# Patient Record
Sex: FEMALE | Race: OTHER | Employment: UNEMPLOYED | URBAN - METROPOLITAN AREA
[De-identification: names, ages, dates, MRNs, and addresses within clinical notes are randomized per-mention and may not be internally consistent; named-entity substitution may affect disease eponyms.]

---

## 2024-05-10 ENCOUNTER — HOSPITAL ENCOUNTER (EMERGENCY)
Age: 39
Discharge: PSYCHIATRIC OR SUBSTANCE USE FACILITY | End: 2024-05-11
Attending: EMERGENCY MEDICINE

## 2024-05-10 VITALS
HEART RATE: 100 BPM | TEMPERATURE: 99.4 F | WEIGHT: 132.28 LBS | HEIGHT: 63 IN | SYSTOLIC BLOOD PRESSURE: 127 MMHG | DIASTOLIC BLOOD PRESSURE: 85 MMHG | BODY MASS INDEX: 23.44 KG/M2 | RESPIRATION RATE: 20 BRPM | OXYGEN SATURATION: 99 %

## 2024-05-10 DIAGNOSIS — F20.9 SCHIZOPHRENIA, UNSPECIFIED TYPE (HCC): ICD-10-CM

## 2024-05-10 DIAGNOSIS — R45.851 SUICIDAL IDEATION: Primary | ICD-10-CM

## 2024-05-10 LAB
ALBUMIN SERPL-MCNC: 4.5 G/DL (ref 3.4–5)
ALBUMIN/GLOB SERPL: 1.6 {RATIO} (ref 1.1–2.2)
ALP SERPL-CCNC: 46 U/L (ref 40–129)
ALT SERPL-CCNC: 11 U/L (ref 10–40)
AMPHETAMINES UR QL SCN>1000 NG/ML: NORMAL
ANION GAP SERPL CALCULATED.3IONS-SCNC: 13 MMOL/L (ref 3–16)
APAP SERPL-MCNC: <5 UG/ML (ref 10–30)
AST SERPL-CCNC: 17 U/L (ref 15–37)
BACTERIA URNS QL MICRO: NORMAL /HPF
BARBITURATES UR QL SCN>200 NG/ML: NORMAL
BASOPHILS # BLD: 0.1 K/UL (ref 0–0.2)
BASOPHILS NFR BLD: 0.9 %
BENZODIAZ UR QL SCN>200 NG/ML: NORMAL
BILIRUB SERPL-MCNC: 0.3 MG/DL (ref 0–1)
BILIRUB UR QL STRIP.AUTO: NEGATIVE
BUN SERPL-MCNC: 8 MG/DL (ref 7–20)
CALCIUM SERPL-MCNC: 9.1 MG/DL (ref 8.3–10.6)
CANNABINOIDS UR QL SCN>50 NG/ML: NORMAL
CHLORIDE SERPL-SCNC: 104 MMOL/L (ref 99–110)
CLARITY UR: CLEAR
CO2 SERPL-SCNC: 23 MMOL/L (ref 21–32)
COCAINE UR QL SCN: NORMAL
COLOR UR: YELLOW
CREAT SERPL-MCNC: 0.6 MG/DL (ref 0.6–1.1)
DEPRECATED RDW RBC AUTO: 15 % (ref 12.4–15.4)
DRUG SCREEN COMMENT UR-IMP: NORMAL
EOSINOPHIL # BLD: 0.1 K/UL (ref 0–0.6)
EOSINOPHIL NFR BLD: 1.1 %
EPI CELLS #/AREA URNS AUTO: 0 /HPF (ref 0–5)
ETHANOLAMINE SERPL-MCNC: NORMAL MG/DL (ref 0–0.08)
FENTANYL SCREEN, URINE: NORMAL
FLUAV RNA RESP QL NAA+PROBE: NOT DETECTED
FLUBV RNA RESP QL NAA+PROBE: NOT DETECTED
GFR SERPLBLD CREATININE-BSD FMLA CKD-EPI: >90 ML/MIN/{1.73_M2}
GLUCOSE SERPL-MCNC: 113 MG/DL (ref 70–99)
GLUCOSE UR STRIP.AUTO-MCNC: NEGATIVE MG/DL
HCG SERPL QL: NEGATIVE
HCT VFR BLD AUTO: 37.9 % (ref 36–48)
HGB BLD-MCNC: 12.8 G/DL (ref 12–16)
HGB UR QL STRIP.AUTO: NEGATIVE
HYALINE CASTS #/AREA URNS AUTO: 0 /LPF (ref 0–8)
KETONES UR STRIP.AUTO-MCNC: NEGATIVE MG/DL
LEUKOCYTE ESTERASE UR QL STRIP.AUTO: ABNORMAL
LIPASE SERPL-CCNC: 24 U/L (ref 13–60)
LYMPHOCYTES # BLD: 1.4 K/UL (ref 1–5.1)
LYMPHOCYTES NFR BLD: 21.7 %
MCH RBC QN AUTO: 28.1 PG (ref 26–34)
MCHC RBC AUTO-ENTMCNC: 33.9 G/DL (ref 31–36)
MCV RBC AUTO: 82.9 FL (ref 80–100)
METHADONE UR QL SCN>300 NG/ML: NORMAL
MONOCYTES # BLD: 0.4 K/UL (ref 0–1.3)
MONOCYTES NFR BLD: 5.7 %
NEUTROPHILS # BLD: 4.7 K/UL (ref 1.7–7.7)
NEUTROPHILS NFR BLD: 70.6 %
NITRITE UR QL STRIP.AUTO: NEGATIVE
OPIATES UR QL SCN>300 NG/ML: NORMAL
OXYCODONE UR QL SCN: NORMAL
PCP UR QL SCN>25 NG/ML: NORMAL
PH UR STRIP.AUTO: 8 [PH] (ref 5–8)
PH UR STRIP: 8 [PH]
PLATELET # BLD AUTO: 256 K/UL (ref 135–450)
PMV BLD AUTO: 7.8 FL (ref 5–10.5)
POTASSIUM SERPL-SCNC: 4 MMOL/L (ref 3.5–5.1)
PROT SERPL-MCNC: 7.3 G/DL (ref 6.4–8.2)
PROT UR STRIP.AUTO-MCNC: NEGATIVE MG/DL
RBC # BLD AUTO: 4.57 M/UL (ref 4–5.2)
RBC CLUMPS #/AREA URNS AUTO: 2 /HPF (ref 0–4)
SALICYLATES SERPL-MCNC: <0.3 MG/DL (ref 15–30)
SARS-COV-2 RNA RESP QL NAA+PROBE: NOT DETECTED
SODIUM SERPL-SCNC: 140 MMOL/L (ref 136–145)
SP GR UR STRIP.AUTO: <=1.005 (ref 1–1.03)
UA COMPLETE W REFLEX CULTURE PNL UR: ABNORMAL
UA DIPSTICK W REFLEX MICRO PNL UR: YES
URN SPEC COLLECT METH UR: ABNORMAL
UROBILINOGEN UR STRIP-ACNC: 0.2 E.U./DL
WBC # BLD AUTO: 6.6 K/UL (ref 4–11)
WBC #/AREA URNS AUTO: 1 /HPF (ref 0–5)

## 2024-05-10 PROCEDURE — 83690 ASSAY OF LIPASE: CPT

## 2024-05-10 PROCEDURE — 87636 SARSCOV2 & INF A&B AMP PRB: CPT

## 2024-05-10 PROCEDURE — 80053 COMPREHEN METABOLIC PANEL: CPT

## 2024-05-10 PROCEDURE — 99285 EMERGENCY DEPT VISIT HI MDM: CPT

## 2024-05-10 PROCEDURE — 6370000000 HC RX 637 (ALT 250 FOR IP): Performed by: PHYSICIAN ASSISTANT

## 2024-05-10 PROCEDURE — 80143 DRUG ASSAY ACETAMINOPHEN: CPT

## 2024-05-10 PROCEDURE — 80179 DRUG ASSAY SALICYLATE: CPT

## 2024-05-10 PROCEDURE — 84703 CHORIONIC GONADOTROPIN ASSAY: CPT

## 2024-05-10 PROCEDURE — 81001 URINALYSIS AUTO W/SCOPE: CPT

## 2024-05-10 PROCEDURE — 85025 COMPLETE CBC W/AUTO DIFF WBC: CPT

## 2024-05-10 PROCEDURE — 80307 DRUG TEST PRSMV CHEM ANLYZR: CPT

## 2024-05-10 PROCEDURE — 82077 ASSAY SPEC XCP UR&BREATH IA: CPT

## 2024-05-10 RX ORDER — ESCITALOPRAM OXALATE 20 MG/1
20 TABLET ORAL DAILY
Status: ON HOLD | COMMUNITY

## 2024-05-10 RX ORDER — ARIPIPRAZOLE 30 MG/1
30 TABLET ORAL DAILY
Status: ON HOLD | COMMUNITY

## 2024-05-10 RX ORDER — OLANZAPINE 5 MG/1
20 TABLET ORAL NIGHTLY
Status: DISCONTINUED | OUTPATIENT
Start: 2024-05-10 | End: 2024-05-11 | Stop reason: HOSPADM

## 2024-05-10 RX ORDER — OLANZAPINE 20 MG/1
20 TABLET ORAL NIGHTLY
Status: ON HOLD | COMMUNITY

## 2024-05-10 RX ADMIN — OLANZAPINE 20 MG: 5 TABLET, FILM COATED ORAL at 23:37

## 2024-05-10 ASSESSMENT — ENCOUNTER SYMPTOMS
COUGH: 0
VOMITING: 0
ABDOMINAL PAIN: 0
WHEEZING: 0
SHORTNESS OF BREATH: 0
CHEST TIGHTNESS: 0
NAUSEA: 1
DIARRHEA: 0

## 2024-05-10 ASSESSMENT — PAIN - FUNCTIONAL ASSESSMENT: PAIN_FUNCTIONAL_ASSESSMENT: 0-10

## 2024-05-10 ASSESSMENT — LIFESTYLE VARIABLES
HOW OFTEN DO YOU HAVE A DRINK CONTAINING ALCOHOL: NEVER
HOW MANY STANDARD DRINKS CONTAINING ALCOHOL DO YOU HAVE ON A TYPICAL DAY: PATIENT DOES NOT DRINK

## 2024-05-10 ASSESSMENT — PAIN SCALES - GENERAL: PAINLEVEL_OUTOF10: 10

## 2024-05-10 NOTE — ED PROVIDER NOTES
[facility] EMERGENCY DEPARTMENT  EMERGENCY DEPARTMENT ENCOUNTER        Pt Name: Mahesh Youngblood  MRN: [id number]  Birthdate 1985  Date of evaluation: 5/10/2024  Provider: Giovany Moore PA-C  PCP: No primary care provider on file.  Note Started: 7:37 PM EDT 5/10/24       I have seen and evaluated this patient with my supervising physician Jerry Barlow MD.      CHIEF COMPLAINT       Chief Complaint   Patient presents with    Headache     Pt had a severe headache and illness symptoms starting last night    Emesis    Nausea    Cough       HISTORY OF PRESENT ILLNESS: 1 or more Elements     History from : Patient    Limitations to history : None    Mahesh Youngblood is a 38 y.o. female with a history of depression, anxiety and schizophrenia who presents to the emergency department today with her brother stating she just had a bad break-up and is now very depressed.  I did gather history from both patient and her brother, whom she lives with.  Chief complaint states \"headache\" however patient denies headache but states her depression is \"hurting my mind\".  Patient continues stating she is very scared and is \"out of my mind\".  Brother states patient and her family just moved here from [place] 1 year ago.  Brother states patient did have some episodes of what he describes as a psychotic break while patient lived in [place].  He states that patient is very scared that that is going to happen again.  Brother states that patient has become very aggressive in the past towards other people and even herself during these episodes and patient states she is very scared this is going to happen again.  Patient denies headache.  She denies lightheadedness, dizziness, vision changes, numbness, tingling or weakness in her extremities.  She denies chest pain or shortness of breath.  She does report a few episodes of nausea and vomiting today but denies abdominal pain.  Patient seems very tearful and upset.  She denies  radiographic images are visualized and preliminarily interpreted by the ED Provider with the below findings:        Interpretation per the Radiologist below, if available at the time of this note:    No orders to display     No results found.    No results found.    PROCEDURES   Unless otherwise noted below, none     Procedures    CRITICAL CARE TIME (.cctime)   There was a high probability of life-threatening clinical deterioration in the patient's condition requiring my urgent intervention.  I personally saw the patient and independently provided 36 minutes of non-concurrent critical care out of the total shared critical care time provided, excluding separately reportable procedures.         PAST MEDICAL HISTORY      has a past medical history of Anxiety, Depression, and Schizophrenia ([facility]).     Chronic Conditions affecting Care: None    EMERGENCY DEPARTMENT COURSE and DIFFERENTIAL DIAGNOSIS/MDM:   Vitals:    Vitals:    05/10/24 1845 05/10/24 1852   BP:  127/85   Pulse: 100    Resp: 20    Temp: 99.4 °F (37.4 °C)    TempSrc: Oral    SpO2: 99%    Weight: 60 kg (132 lb 4.4 oz)    Height: 1.59 m (5' 2.6\")        Patient was given the following medications:  Medications - No data to display          Is this patient to be included in the SEP-1 Core Measure due to severe sepsis or septic shock?   No   Exclusion criteria - the patient is NOT to be included for SEP-1 Core Measure due to:  Infection is not suspected    CONSULTS: (Who and What was discussed)  IP CONSULT TO TELE-PSYCH (SOCIAL WORK ONLY)  Discussion with Other Profesionals : Consultant .  Psychiatry    Social Determinants : None    Records Reviewed : None    CC/HPI Summary, DDx, ED Course, and Reassessment: Patient with a history of depression, anxiety and schizophrenia presented to the emergency department today with her brother stating she just had a bad break-up and is now very depressed.  I did gather history from both patient and her brother, whom she

## 2024-05-10 NOTE — ED NOTES
Pt states that she does take medications but cannot remember what kind. Is calling home to get medication information   1988

## 2024-05-11 ENCOUNTER — HOSPITAL ENCOUNTER (INPATIENT)
Age: 39
LOS: 10 days | Discharge: HOME | DRG: 885 | End: 2024-05-21
Attending: PSYCHIATRY & NEUROLOGY | Admitting: PSYCHIATRY & NEUROLOGY

## 2024-05-11 ENCOUNTER — APPOINTMENT (OUTPATIENT)
Dept: CT IMAGING | Age: 39
DRG: 885 | End: 2024-05-11
Attending: PSYCHIATRY & NEUROLOGY

## 2024-05-11 PROBLEM — F33.9 MAJOR DEPRESSIVE DISORDER, RECURRENT (HCC): Status: ACTIVE | Noted: 2024-05-11

## 2024-05-11 PROBLEM — F29 PSYCHOSIS (HCC): Status: ACTIVE | Noted: 2024-05-11

## 2024-05-11 PROBLEM — R42 DIZZINESS: Status: ACTIVE | Noted: 2024-05-11

## 2024-05-11 LAB
EKG ATRIAL RATE: 87 BPM
EKG DIAGNOSIS: NORMAL
EKG P AXIS: 60 DEGREES
EKG P-R INTERVAL: 188 MS
EKG Q-T INTERVAL: 352 MS
EKG QRS DURATION: 66 MS
EKG QTC CALCULATION (BAZETT): 423 MS
EKG R AXIS: 12 DEGREES
EKG T AXIS: 43 DEGREES
EKG VENTRICULAR RATE: 87 BPM
TSH SERPL DL<=0.005 MIU/L-ACNC: 3.63 UIU/ML (ref 0.27–4.2)

## 2024-05-11 PROCEDURE — 1240000000 HC EMOTIONAL WELLNESS R&B

## 2024-05-11 PROCEDURE — 93010 ELECTROCARDIOGRAM REPORT: CPT | Performed by: INTERNAL MEDICINE

## 2024-05-11 PROCEDURE — 84443 ASSAY THYROID STIM HORMONE: CPT

## 2024-05-11 PROCEDURE — 99221 1ST HOSP IP/OBS SF/LOW 40: CPT

## 2024-05-11 PROCEDURE — 36415 COLL VENOUS BLD VENIPUNCTURE: CPT

## 2024-05-11 PROCEDURE — 6370000000 HC RX 637 (ALT 250 FOR IP): Performed by: NURSE PRACTITIONER

## 2024-05-11 PROCEDURE — 83036 HEMOGLOBIN GLYCOSYLATED A1C: CPT

## 2024-05-11 PROCEDURE — 80061 LIPID PANEL: CPT

## 2024-05-11 PROCEDURE — 70450 CT HEAD/BRAIN W/O DYE: CPT

## 2024-05-11 PROCEDURE — 93005 ELECTROCARDIOGRAM TRACING: CPT | Performed by: NURSE PRACTITIONER

## 2024-05-11 RX ORDER — MAGNESIUM HYDROXIDE/ALUMINUM HYDROXICE/SIMETHICONE 120; 1200; 1200 MG/30ML; MG/30ML; MG/30ML
30 SUSPENSION ORAL EVERY 6 HOURS PRN
Status: DISCONTINUED | OUTPATIENT
Start: 2024-05-11 | End: 2024-05-21 | Stop reason: HOSPADM

## 2024-05-11 RX ORDER — HYDROXYZINE 50 MG/1
50 TABLET, FILM COATED ORAL 3 TIMES DAILY PRN
Status: DISCONTINUED | OUTPATIENT
Start: 2024-05-11 | End: 2024-05-21 | Stop reason: HOSPADM

## 2024-05-11 RX ORDER — NICOTINE 21 MG/24HR
1 PATCH, TRANSDERMAL 24 HOURS TRANSDERMAL DAILY
Status: DISCONTINUED | OUTPATIENT
Start: 2024-05-11 | End: 2024-05-21 | Stop reason: HOSPADM

## 2024-05-11 RX ORDER — TRAZODONE HYDROCHLORIDE 50 MG/1
50 TABLET ORAL NIGHTLY PRN
Status: DISCONTINUED | OUTPATIENT
Start: 2024-05-11 | End: 2024-05-21 | Stop reason: HOSPADM

## 2024-05-11 RX ORDER — OLANZAPINE 10 MG/1
40 TABLET ORAL NIGHTLY
Status: DISCONTINUED | OUTPATIENT
Start: 2024-05-11 | End: 2024-05-12

## 2024-05-11 RX ORDER — ACETAMINOPHEN 325 MG/1
650 TABLET ORAL EVERY 4 HOURS PRN
Status: DISCONTINUED | OUTPATIENT
Start: 2024-05-11 | End: 2024-05-21 | Stop reason: HOSPADM

## 2024-05-11 RX ORDER — IBUPROFEN 400 MG/1
400 TABLET ORAL EVERY 6 HOURS PRN
Status: DISCONTINUED | OUTPATIENT
Start: 2024-05-11 | End: 2024-05-21 | Stop reason: HOSPADM

## 2024-05-11 RX ORDER — BENZTROPINE MESYLATE 1 MG/ML
2 INJECTION INTRAMUSCULAR; INTRAVENOUS 2 TIMES DAILY PRN
Status: DISCONTINUED | OUTPATIENT
Start: 2024-05-11 | End: 2024-05-21 | Stop reason: HOSPADM

## 2024-05-11 RX ORDER — ARIPIPRAZOLE 15 MG/1
30 TABLET ORAL DAILY
Status: DISCONTINUED | OUTPATIENT
Start: 2024-05-12 | End: 2024-05-17

## 2024-05-11 RX ORDER — POLYETHYLENE GLYCOL 3350 17 G
2 POWDER IN PACKET (EA) ORAL
Status: DISCONTINUED | OUTPATIENT
Start: 2024-05-11 | End: 2024-05-21 | Stop reason: HOSPADM

## 2024-05-11 RX ORDER — OLANZAPINE 5 MG/1
5 TABLET ORAL EVERY 4 HOURS PRN
Status: DISCONTINUED | OUTPATIENT
Start: 2024-05-11 | End: 2024-05-21 | Stop reason: HOSPADM

## 2024-05-11 RX ADMIN — OLANZAPINE 40 MG: 10 TABLET, FILM COATED ORAL at 21:47

## 2024-05-11 ASSESSMENT — PATIENT HEALTH QUESTIONNAIRE - PHQ9
10. IF YOU CHECKED OFF ANY PROBLEMS, HOW DIFFICULT HAVE THESE PROBLEMS MADE IT FOR YOU TO DO YOUR WORK, TAKE CARE OF THINGS AT HOME, OR GET ALONG WITH OTHER PEOPLE: VERY DIFFICULT
4. FEELING TIRED OR HAVING LITTLE ENERGY: NEARLY EVERY DAY
6. FEELING BAD ABOUT YOURSELF - OR THAT YOU ARE A FAILURE OR HAVE LET YOURSELF OR YOUR FAMILY DOWN: NOT AT ALL
5. POOR APPETITE OR OVEREATING: NEARLY EVERY DAY
1. LITTLE INTEREST OR PLEASURE IN DOING THINGS: NEARLY EVERY DAY
SUM OF ALL RESPONSES TO PHQ9 QUESTIONS 1 & 2: 6
SUM OF ALL RESPONSES TO PHQ QUESTIONS 1-9: 20
8. MOVING OR SPEAKING SO SLOWLY THAT OTHER PEOPLE COULD HAVE NOTICED. OR THE OPPOSITE, BEING SO FIGETY OR RESTLESS THAT YOU HAVE BEEN MOVING AROUND A LOT MORE THAN USUAL: NOT AT ALL
9. THOUGHTS THAT YOU WOULD BE BETTER OFF DEAD, OR OF HURTING YOURSELF: NEARLY EVERY DAY
7. TROUBLE CONCENTRATING ON THINGS, SUCH AS READING THE NEWSPAPER OR WATCHING TELEVISION: MORE THAN HALF THE DAYS
2. FEELING DOWN, DEPRESSED OR HOPELESS: NEARLY EVERY DAY
SUM OF ALL RESPONSES TO PHQ QUESTIONS 1-9: 20
SUM OF ALL RESPONSES TO PHQ QUESTIONS 1-9: 20
3. TROUBLE FALLING OR STAYING ASLEEP: NEARLY EVERY DAY
SUM OF ALL RESPONSES TO PHQ QUESTIONS 1-9: 17

## 2024-05-11 ASSESSMENT — SLEEP AND FATIGUE QUESTIONNAIRES
DO YOU HAVE DIFFICULTY SLEEPING: YES
AVERAGE NUMBER OF SLEEP HOURS: 4
DO YOU HAVE DIFFICULTY SLEEPING: YES
AVERAGE NUMBER OF SLEEP HOURS: 4
SLEEP PATTERN: DISTURBED/INTERRUPTED SLEEP;DIFFICULTY FALLING ASLEEP
DO YOU USE A SLEEP AID: NO
DO YOU USE A SLEEP AID: NO

## 2024-05-11 NOTE — H&P
Hospital Medicine History & Physical      PCP: No primary care provider on file.    Date of Admission: 5/11/2024    Date of Service: Pt seen/examined on 5/11/2024     Chief Complaint:  No chief complaint on file.        History Of Present Illness:      The patient is a 38 y.o. female with pmhx of anxiety, depression, schizophrenia who presented to [facility] for depression, psychotic episode.  Patient was seen and evaluated in the ED by the ED medical provider, patient was medically cleared for admission to [facility].  This note serves as an admission medical H&P.    Tobacco use: None   ETOH use: None   Illicit drug use: None     Patient is complaining of mild dizziness. No headache or vision changes. Gait is normal.     Past Medical History:        Diagnosis Date    Anxiety     Depression     Depression     Schizophrenia (HCC)        Past Surgical History:    History reviewed. No pertinent surgical history.    Medications Prior to Admission:    Prior to Admission medications    Medication Sig Start Date End Date Taking? Authorizing Provider   escitalopram (LEXAPRO) 20 MG tablet Take 1 tablet by mouth daily    Elyse Vasquez MD   OLANZapine (ZYPREXA) 20 MG tablet Take 1 tablet by mouth nightly    Elyse Vasquez MD   ARIPiprazole (ABILIFY) 30 MG tablet Take 1 tablet by mouth daily    Elyse Vasquez MD   escitalopram (LEXAPRO) 20 MG tablet Take 1 tablet by mouth daily    ProviderElyse MD       Allergies:  Patient has no known allergies.    Social History:      TOBACCO:   reports that she has never smoked. She has never used smokeless tobacco.  ETOH:   reports no history of alcohol use.      Family History:   Positive as follows:    History reviewed. No pertinent family history.    REVIEW OF SYSTEMS:       Constitutional: Negative for fever   HENT: Negative for sore throat   Eyes: Negative for redness   Respiratory: Negative  for dyspnea, cough   Cardiovascular: Negative for chest

## 2024-05-11 NOTE — VIRTUAL HEALTH
Mahesh Youngblood  [id number]  1985     Social Work Behavioral Health Crisis Assessment    05/10/24    Chief Complaint: Suicidal Ideations    HPI: Patient is a 38 y.o. Other female who presents for suicidal ideations. Patient presented to the ED on 05/10/24 from home.    Past Psychiatric History:  Previous Diagnoses/symptoms: Depression  Previous suicide attempts/self-harm: Denies  Inpatient psychiatric hospitalizations: denies  Current outpatient psychiatric provider: yes  Current therapist: States not in therapy  Previous psychiatric medication trials: No prior medication trials  Current psychiatric medications: No current psychiatric medications  Family Psychiatric History: Denies    Sleep Hours: 8    Sleep concerns: denies    Use of sleep medications: denies    Substance Abuse History:  Tobacco: Denies  Alcohol: Denies  Marijuana: Denies  Stimulant: Denies  Opiates: Denies  Benzodiazepine: Denies  Other illicit drug usage: Denies  History of substance/alcohol abuse treatment: Denies    Social History:  Education: H.S.  Living Situation/Interest: with family  Marital/Committed relationship and parenting hx: single  Occupation: Unemployed  Legal History/Hx of Violence: Denies  Spiritual History: Denies  Psychological trauma, neglect, or abuse: denies hx of trauma/abuse   Access to guns or other weapons: denies having access to firearms/dangerous weapons     Past Medical History:  Active Ambulatory Problems     Diagnosis Date Noted    No Active Ambulatory Problems     Resolved Ambulatory Problems     Diagnosis Date Noted    No Resolved Ambulatory Problems     Past Medical History:   Diagnosis Date    Anxiety     Depression     Schizophrenia (HCC)      Allergies:  No Known Allergies   Medications:  No current facility-administered medications for this encounter.    Current Outpatient Medications:     escitalopram (LEXAPRO) 20 MG tablet, Take 1 tablet by mouth daily, Disp: , Rfl:     OLANZapine (ZYPREXA) 20 MG tablet,  patient focused on a specific thought but it seems she feels her parents put her down or do not understand her. The patient also appears to be having difficulty with her fiance, who lives in [place]. The patient states, \"I am depressed and suicidal.\" The patient called 911 to come to the hospital because she was hitting herself with her hands. The patient also kept repeating, \"I am scared I am going to hurt them.\" The patient would not elaborate on who is \"them.\" The patient states she has no children. The patient reports she was diagnosed with depression as a teen and suffers from panic attacks.     The patient's brother reported to the nursing team the patient suffers from Schizophrenia.      Provisional Dx:   Major Depressive Disorder    Plan:  Collateral: Unable to obtain collateral  Inpatient psychiatric admission at appropriate care level facility, once medically cleared and stable  Legal Status: INVOLUNTARY.  Patient is suicidal - risk of harm to self.  Safety plan created and reviewed with patient, see below for details  Re-consult for any new changes or concerns. Thank you for this consult.  Discussed recommendations with Giovany Moore at time of consult completion.    TelePsych recommendations:Inpatient psychiatric admission      Safety Plan:  reviewed        Electronically signed by Pedrito Hoyt LCSW on 5/10/2024 at 8:09 PM.       Mahesh Youngblood, was evaluated through a synchronous (real-time) audio-video encounter. The patient (and/or guardian if applicable) is aware that this is a billable service, which includes applicable co-pays. This virtual visit was conducted with patient's (and/or legal guardian's) consent. Patient identification was verified, and a caregiver was present when appropriate.  The patient was located at Facility (Appt Department): [facility]  [address] [address]  Loc: [phone]  The provider was located

## 2024-05-11 NOTE — ED PROVIDER NOTES
In addition to the advanced practice provider, I personally saw Mahesh Youngblood and performed a substantive portion of the visit including all aspects of the medical decision making.    Medical Decision Making  Patient presented to the emergency department with depression and acute psychosis.  Her current psychosis was mild, but patient was concerned it would become worse.  When this has happened in the past, she has become violent and aggressive with others.  Later, while talking to telepsych, patient admitted to being suicidal.  She was placed on a hold with plan to obtain emergent psychiatric evaluation.  Her workup was negative and patient remained free of somatic symptoms.  Her vital signs are normal.  She is medically cleared for psychiatric evaluation    FINAL IMPRESSION  1. Suicidal ideation    2. Schizophrenia, unspecified type (HCC)        Blood pressure 127/85, pulse 100, temperature 99.4 °F (37.4 °C), temperature source Oral, resp. rate 20, height 1.59 m (5' 2.6\"), weight 60 kg (132 lb 4.4 oz), SpO2 99 %.     For further details of Mahesh Youngblood's emergency department encounter, please see documentation by advanced practice provider, HALLIE Sharma.       Jerry Barlow MD  05/11/24 0226

## 2024-05-11 NOTE — ED NOTES
A safety risk assessment of the patient environment was conducted and the room was modified for safety. The room is free of all removed equipment, medical supplies, and articles that may pose a threat to the patient or others such as sharp items and needle boxes. Items were removed from unlocked drawers, cabinets are locked when locks are available, extra linens, medical cords, cables, pictures from wall, ect. Are all removed. The patient call light was left in place due to the medical nature of the unit ad the needs of the patient. The patient was place in a room close to the nursing desk. The patient was placed in a hospital gown.     A search of the patient and patient environment was performed. Two staff members (including ) conducted a witnessed search of all belongings in the presence of the patient. All personal items including sharp objects, belts, ties, and ingestibles were removed and secured.     The patient and family were educated regarding items brought in by family members and visitors will be searched to verify that no potentially dangerous items are brought in to the patient. If a visitor refuses search of items- visitor will be instructed that the items cannot enter patient room.     Patient  at bedside. Bed locked and in lowest position with both side rails raised. Call light within reach. Will monitor pt. hourly.
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
## 2024-05-11 NOTE — H&P
INITIAL PSYCHIATRIC HISTORY AND PHYSICAL      Patient name: Mahesh Youngblood  Admit date: 5/11/2024  Today's date: 5/11/2024           CC:  psychosis    HPI:   Patient seen in room on Adult Behavioral Unit.   Patient is a 38 y.o. female who presents to ED with depression and paranoia. Pt moved here from [place] about a year ago. She speaks Wolof as her primary language but is able to communicate in English as well. An  was unavailable at the time of assessment despite multiple attempts to connect with . Pt expressed that she was comfortable speaking English. Pt's brothers visited today and helped to provide some history that was consistent with history the patient reported. She reports that she has had problems with her mental health since she was a teenager, approximately age 14. Reports attending college but being \"kicked out\" of one, finishing her degree in [place]. 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 She reports being engaged and, during the engagement, suffered a mental health crisis which caused her fiance to end the engagement. In her [place], an ended engagement is considered a divorce. This has caused her a lot of shame and she has continued to fixate on this. Will often feel that people are talking about her and making fun of her due to this. 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 Says women were yelling out of their windows at her and shaming her. Reports being on antipsychotic medication since prior to college and when [place], wanted to stop taking the medication prescribed there and wanted to take \"American medication.\" She has a psychiatric provider and reports compliance with medication. She reports [facility] admission at the [facility] for psychiatric issues. When asked about suicidal ideation or attempts,

## 2024-05-11 NOTE — ED NOTES
Pt ambulated to bathroom and provided urine sample.   Tele-psych on video chat at bedside at this time.

## 2024-05-12 LAB
CHOLEST SERPL-MCNC: 156 MG/DL (ref 0–199)
EST. AVERAGE GLUCOSE BLD GHB EST-MCNC: 96.8 MG/DL
HBA1C MFR BLD: 5 %
HDLC SERPL-MCNC: 64 MG/DL (ref 40–60)
LDLC SERPL CALC-MCNC: 78 MG/DL
TRIGL SERPL-MCNC: 71 MG/DL (ref 0–150)
VLDLC SERPL CALC-MCNC: 14 MG/DL

## 2024-05-12 PROCEDURE — 1240000000 HC EMOTIONAL WELLNESS R&B

## 2024-05-12 PROCEDURE — 6370000000 HC RX 637 (ALT 250 FOR IP): Performed by: NURSE PRACTITIONER

## 2024-05-12 RX ORDER — OLANZAPINE 10 MG/1
20 TABLET ORAL NIGHTLY
Status: DISCONTINUED | OUTPATIENT
Start: 2024-05-12 | End: 2024-05-13

## 2024-05-12 RX ORDER — RISPERIDONE 1 MG/1
1 TABLET ORAL 2 TIMES DAILY
Status: DISCONTINUED | OUTPATIENT
Start: 2024-05-12 | End: 2024-05-12

## 2024-05-12 RX ORDER — FLUPHENAZINE HYDROCHLORIDE 1 MG/1
1 TABLET ORAL 2 TIMES DAILY
Status: DISCONTINUED | OUTPATIENT
Start: 2024-05-12 | End: 2024-05-14

## 2024-05-12 RX ADMIN — FLUPHENAZINE HYDROCHLORIDE 1 MG: 1 TABLET ORAL at 11:51

## 2024-05-12 RX ADMIN — FLUPHENAZINE HYDROCHLORIDE 1 MG: 1 TABLET ORAL at 21:01

## 2024-05-12 RX ADMIN — OLANZAPINE 20 MG: 10 TABLET, FILM COATED ORAL at 21:01

## 2024-05-12 RX ADMIN — ARIPIPRAZOLE 30 MG: 15 TABLET ORAL at 09:12

## 2024-05-12 NOTE — BH NOTE
Mahesh wakened for morning meal and was medication compliant. Declined any needs this am and reported \"yes\" to having a restful sleep.

## 2024-05-12 NOTE — BH NOTE
Brothers in to visit. Requested to remain in room. Chairs placed for her brothers to sit in the doorway to visit. After visitation Mahesh closed the door and remain in her room. Mahesh will keep her food containers throughout the day, in her room. Fluids taken good. Attending to ADL's.

## 2024-05-12 NOTE — BH NOTE
Mahesh was reluctant with staff taking old food containers out of room. \"It's OK if someone could eat the food\". Made this statement, in English, without difficulty. Declined any fluids to drink with her supper.

## 2024-05-13 PROCEDURE — 6370000000 HC RX 637 (ALT 250 FOR IP): Performed by: PSYCHIATRY & NEUROLOGY

## 2024-05-13 PROCEDURE — 6370000000 HC RX 637 (ALT 250 FOR IP): Performed by: NURSE PRACTITIONER

## 2024-05-13 PROCEDURE — 1240000000 HC EMOTIONAL WELLNESS R&B

## 2024-05-13 RX ORDER — OLANZAPINE 10 MG/1
10 TABLET ORAL NIGHTLY
Status: DISCONTINUED | OUTPATIENT
Start: 2024-05-13 | End: 2024-05-14

## 2024-05-13 RX ADMIN — OLANZAPINE 10 MG: 10 TABLET, FILM COATED ORAL at 20:55

## 2024-05-13 RX ADMIN — FLUPHENAZINE HYDROCHLORIDE 1 MG: 1 TABLET ORAL at 09:13

## 2024-05-13 RX ADMIN — ACETAMINOPHEN 650 MG: 325 TABLET ORAL at 09:13

## 2024-05-13 RX ADMIN — TRAZODONE HYDROCHLORIDE 50 MG: 50 TABLET ORAL at 20:56

## 2024-05-13 RX ADMIN — TRAZODONE HYDROCHLORIDE 50 MG: 50 TABLET ORAL at 00:45

## 2024-05-13 RX ADMIN — FLUPHENAZINE HYDROCHLORIDE 1 MG: 1 TABLET ORAL at 20:55

## 2024-05-13 RX ADMIN — ARIPIPRAZOLE 30 MG: 15 TABLET ORAL at 09:13

## 2024-05-13 ASSESSMENT — PAIN DESCRIPTION - LOCATION: LOCATION: HEAD

## 2024-05-13 ASSESSMENT — PAIN SCALES - GENERAL: PAINLEVEL_OUTOF10: 0

## 2024-05-13 NOTE — BH NOTE
[facility]  Treatment Team Note  Review Date & Time: 5/13/24  0356    Patient was not in treatment team      Status EXAM:   Mental Status and Behavioral Exam  Normal: No  Level of Assistance: Independent/Self  Facial Expression: Brightened  Affect: Appropriate  Level of Consciousness: Alert  Frequency of Checks: 4 times per hour, close  Mood:Normal: No  Mood: Other (comment)  Motor Activity:Normal: No  Motor Activity: Decreased  Eye Contact: Good  Observed Behavior: Withdrawn, Cooperative, Friendly  Sexual Misconduct History: Past - no  Preception: [place] to person, [place] to time, [place] to place, [place] to situation  Attention:Normal: Yes  Attention: Unable to concentrate  Thought Processes: Unremarkable  Thought Content:Normal: No  Thought Content: Delusions  Depression Symptoms: Change in energy level  Anxiety Symptoms: Generalized  [place]ella Symptoms: No problems reported or observed.  Hallucinations: Auditory (comment), Visual (comment)  Delusions: Yes  Delusions: Paranoid  Memory:Normal: No  Memory: Poor recent  Insight and Judgment: No  Insight and Judgment: Poor judgment, Poor insight      Suicide Risk CSSR-S:  1) Within the past month, have you wished you were dead or wished you could go to sleep and not wake up? : Yes  2) Have you actually had any thoughts of killing yourself? : No  3) Have you been thinking about how you might kill yourself? : No  5) Have you started to work out or worked out the details of how to kill yourself? Do you intend to carry out this plan? : No  6) Have you ever done anything, started to do anything, or prepared to do anything to end your life?: No      PLAN/TREATMENT RECOMMENDATIONS UPDATE: Patient will take medication as prescribed, eat 75% of meals, attend groups, participate in milieu activities, participate in treatment team and care planning for discharge and follow up.           Nette Cote RN

## 2024-05-14 PROCEDURE — 6370000000 HC RX 637 (ALT 250 FOR IP): Performed by: PSYCHIATRY & NEUROLOGY

## 2024-05-14 PROCEDURE — 6370000000 HC RX 637 (ALT 250 FOR IP): Performed by: NURSE PRACTITIONER

## 2024-05-14 PROCEDURE — 1240000000 HC EMOTIONAL WELLNESS R&B

## 2024-05-14 RX ORDER — OLANZAPINE 10 MG/1
10 TABLET ORAL NIGHTLY
Status: COMPLETED | OUTPATIENT
Start: 2024-05-14 | End: 2024-05-14

## 2024-05-14 RX ORDER — FLUPHENAZINE HYDROCHLORIDE 5 MG/1
5 TABLET ORAL NIGHTLY
Status: DISCONTINUED | OUTPATIENT
Start: 2024-05-15 | End: 2024-05-17

## 2024-05-14 RX ORDER — FLUPHENAZINE HYDROCHLORIDE 1 MG/1
1 TABLET ORAL 2 TIMES DAILY
Status: COMPLETED | OUTPATIENT
Start: 2024-05-14 | End: 2024-05-14

## 2024-05-14 RX ADMIN — FLUPHENAZINE HYDROCHLORIDE 1 MG: 1 TABLET ORAL at 09:21

## 2024-05-14 RX ADMIN — OLANZAPINE 10 MG: 10 TABLET, FILM COATED ORAL at 20:19

## 2024-05-14 RX ADMIN — TRAZODONE HYDROCHLORIDE 50 MG: 50 TABLET ORAL at 20:18

## 2024-05-14 RX ADMIN — ARIPIPRAZOLE 30 MG: 15 TABLET ORAL at 09:21

## 2024-05-14 RX ADMIN — FLUPHENAZINE HYDROCHLORIDE 1 MG: 1 TABLET ORAL at 20:19

## 2024-05-14 ASSESSMENT — PAIN SCALES - GENERAL: PAINLEVEL_OUTOF10: 0

## 2024-05-14 NOTE — BH NOTE
Clinician met with the patient and her 2 brothers. She signed a MANJIT for them to join in the discharge plan. Clinician was able to verify the discharge address and call her psychiatrist to set up the appointment with them there to arrange the ride to and from for the patient. Patient will not go to a PCP for western medicine provider and uses holistic. Clinician put the referral in the discharge in case she changes her mind.    Patient discussed her history of A/H, command in nature, medications that worked Lamictal, her stopping medications when she stabilizes, due to thinking she doesn't need them anymore. She has had the same persecutory, paranoid delusion of the University in [facility] following her to [place] and then to the , still having eyes on her and out to harm her. She has severe anxiety that has risen to the point where she avoids all people/family members and cannot step foot out of the house. She has not been able to go outside of her home in over a month (agoraphobia). Panic attacks and the voices command her to stay inside due to the people watching her and that they want to hurt her.     Patient and her brothers discussed an obsession with getting . She has dating apps and as soon as a man responds to her and says hi, she gets 2 or 3 sentences in and states she wants to  them and they disappear.

## 2024-05-15 PROCEDURE — 6370000000 HC RX 637 (ALT 250 FOR IP): Performed by: NURSE PRACTITIONER

## 2024-05-15 PROCEDURE — 6370000000 HC RX 637 (ALT 250 FOR IP): Performed by: PSYCHIATRY & NEUROLOGY

## 2024-05-15 PROCEDURE — 1240000000 HC EMOTIONAL WELLNESS R&B

## 2024-05-15 RX ADMIN — TRAZODONE HYDROCHLORIDE 50 MG: 50 TABLET ORAL at 20:43

## 2024-05-15 RX ADMIN — FLUPHENAZINE HYDROCHLORIDE 5 MG: 5 TABLET ORAL at 20:43

## 2024-05-15 RX ADMIN — ARIPIPRAZOLE 30 MG: 15 TABLET ORAL at 09:20

## 2024-05-15 NOTE — BH NOTE
Clinician relayed the recent and long term functioning to patient's doctor for the best treatment of the patient.

## 2024-05-16 PROCEDURE — 6370000000 HC RX 637 (ALT 250 FOR IP): Performed by: NURSE PRACTITIONER

## 2024-05-16 PROCEDURE — 6370000000 HC RX 637 (ALT 250 FOR IP): Performed by: PSYCHIATRY & NEUROLOGY

## 2024-05-16 PROCEDURE — 1240000000 HC EMOTIONAL WELLNESS R&B

## 2024-05-16 RX ADMIN — TRAZODONE HYDROCHLORIDE 50 MG: 50 TABLET ORAL at 21:04

## 2024-05-16 RX ADMIN — ARIPIPRAZOLE 30 MG: 15 TABLET ORAL at 09:32

## 2024-05-16 RX ADMIN — FLUPHENAZINE HYDROCHLORIDE 5 MG: 5 TABLET ORAL at 21:04

## 2024-05-16 ASSESSMENT — PAIN SCALES - GENERAL: PAINLEVEL_OUTOF10: 0

## 2024-05-16 NOTE — GROUP NOTE
Group Therapy Note    Date: 5/16/2024    Group Start Time: 1100  Group End Time: 1145  Group Topic: Psychoeducation    [facility]    Joseph Fortune        Group Therapy Note    Topic: Communication Styles and Behaviors in a Healthy Relationship    Group facilitator led discussion of three aspects of communication: verbal, nonverbal, paraverbal; three styles of communication: assertive, passive, aggressive. Group concluded with facilitated education on behaviors and aspects of healthy relationships: respect, equality, safety, trust, support, negotiation, independence.    Attendees: 11       Notes:  Mahesh present and attentive across session, did not engage in collaborative discussions. Neutral affect with minimal engagement in response to environmental stimuli.      Discipline Responsible: Psychoeducational Specialist      Signature:  Joseph Fortune, KATHERINE, MT-BC

## 2024-05-17 PROCEDURE — 6370000000 HC RX 637 (ALT 250 FOR IP): Performed by: NURSE PRACTITIONER

## 2024-05-17 PROCEDURE — 1240000000 HC EMOTIONAL WELLNESS R&B

## 2024-05-17 PROCEDURE — 6370000000 HC RX 637 (ALT 250 FOR IP): Performed by: PSYCHIATRY & NEUROLOGY

## 2024-05-17 RX ORDER — ARIPIPRAZOLE 10 MG/1
20 TABLET ORAL DAILY
Status: DISCONTINUED | OUTPATIENT
Start: 2024-05-18 | End: 2024-05-21 | Stop reason: HOSPADM

## 2024-05-17 RX ORDER — FLUPHENAZINE HYDROCHLORIDE 10 MG/1
10 TABLET ORAL NIGHTLY
Status: DISCONTINUED | OUTPATIENT
Start: 2024-05-17 | End: 2024-05-21 | Stop reason: HOSPADM

## 2024-05-17 RX ADMIN — TRAZODONE HYDROCHLORIDE 50 MG: 50 TABLET ORAL at 20:57

## 2024-05-17 RX ADMIN — ARIPIPRAZOLE 30 MG: 15 TABLET ORAL at 09:22

## 2024-05-17 RX ADMIN — FLUPHENAZINE HYDROCHLORIDE 10 MG: 10 TABLET ORAL at 20:57

## 2024-05-17 ASSESSMENT — PAIN SCALES - GENERAL
PAINLEVEL_OUTOF10: 0
PAINLEVEL_OUTOF10: 0

## 2024-05-18 PROCEDURE — 6370000000 HC RX 637 (ALT 250 FOR IP): Performed by: PSYCHIATRY & NEUROLOGY

## 2024-05-18 PROCEDURE — 1240000000 HC EMOTIONAL WELLNESS R&B

## 2024-05-18 RX ADMIN — ARIPIPRAZOLE 20 MG: 10 TABLET ORAL at 09:46

## 2024-05-18 RX ADMIN — FLUPHENAZINE HYDROCHLORIDE 10 MG: 10 TABLET ORAL at 20:12

## 2024-05-19 PROCEDURE — 1240000000 HC EMOTIONAL WELLNESS R&B

## 2024-05-19 PROCEDURE — 6370000000 HC RX 637 (ALT 250 FOR IP): Performed by: NURSE PRACTITIONER

## 2024-05-19 PROCEDURE — 6370000000 HC RX 637 (ALT 250 FOR IP): Performed by: PSYCHIATRY & NEUROLOGY

## 2024-05-19 RX ADMIN — HYDROXYZINE HYDROCHLORIDE 50 MG: 50 TABLET, FILM COATED ORAL at 20:30

## 2024-05-19 RX ADMIN — ARIPIPRAZOLE 20 MG: 10 TABLET ORAL at 09:29

## 2024-05-19 RX ADMIN — HYDROXYZINE HYDROCHLORIDE 50 MG: 50 TABLET, FILM COATED ORAL at 10:57

## 2024-05-19 RX ADMIN — TRAZODONE HYDROCHLORIDE 50 MG: 50 TABLET ORAL at 20:30

## 2024-05-19 RX ADMIN — FLUPHENAZINE HYDROCHLORIDE 10 MG: 10 TABLET ORAL at 20:30

## 2024-05-20 PROCEDURE — 1240000000 HC EMOTIONAL WELLNESS R&B

## 2024-05-20 PROCEDURE — 6370000000 HC RX 637 (ALT 250 FOR IP): Performed by: PSYCHIATRY & NEUROLOGY

## 2024-05-20 PROCEDURE — 6370000000 HC RX 637 (ALT 250 FOR IP): Performed by: NURSE PRACTITIONER

## 2024-05-20 RX ADMIN — ARIPIPRAZOLE 20 MG: 10 TABLET ORAL at 08:39

## 2024-05-20 RX ADMIN — TRAZODONE HYDROCHLORIDE 50 MG: 50 TABLET ORAL at 20:55

## 2024-05-20 RX ADMIN — FLUPHENAZINE HYDROCHLORIDE 10 MG: 10 TABLET ORAL at 20:55

## 2024-05-20 RX ADMIN — HYDROXYZINE HYDROCHLORIDE 50 MG: 50 TABLET, FILM COATED ORAL at 20:55

## 2024-05-20 NOTE — BH NOTE
Clinician and nurse Kenyon discussed patient discharging first thing in the morning to make her psych. Appointment at 10:50 am. We will notify doctor first thing in the morning to set up early discharge.

## 2024-05-20 NOTE — GROUP NOTE
Group Therapy Note    Date: 5/20/2024    Group Start Time: 1000  Group End Time: 1045  Group Topic: Cognitive Skills    [facility]    Brooke Dudley        Group Therapy Note    Attendees: 14    Group members engaged in the exercise \"Creating Connections.\" Group members were given a ball of yarn and were given various prompts. With the yarn, group members created a spider web that was known as the connection. The goal of the group was to show people the similarities and differences of one another, but they can still be connected even with differences.    Notes:  Patient attended group for the full duration. Patient remained engaged and interacted appropriately with other members of the group.     Status After Intervention:  Improved    Participation Level: Active Listener and Interactive    Participation Quality: Appropriate      Speech:  normal      Thought Process/Content: Logical      Affective Functioning: Congruent      Mood: Brightened      Level of consciousness:  Alert      Response to Learning: Able to verbalize current knowledge/experience      Endings: None Reported    Modes of Intervention: Socialization, Exploration, and Activity      Discipline Responsible: Behavorial Health Tech      Signature:  ANUSHA SALTER

## 2024-05-20 NOTE — GROUP NOTE
Group Therapy Note    Date: 5/20/2024    Group Start Time: 1100  Group End Time: 1145  Group Topic: Psychoeducation    [facility]    Romina Fermin,         Group Therapy Note    Attendees: 6    Psych Education session was focused on discharge preparation and after care.  Participants were able to ask questions as they learned about how the discharge process works.  Individuals were given time to work on their safety plans and processed ways to help prevent a crisis.      Notes:  Pt was present and engaged across session.  Participated in working on and completing safety plan and OQ.  Met goal for group.    Status After Intervention:  Improved    Participation Level: Active Listener and Interactive    Participation Quality: Appropriate, Attentive, and Sharing      Speech:  normal      Thought Process/Content: Logical      Affective Functioning: Congruent      Mood: euthymic      Level of consciousness:  Alert and Attentive      Response to Learning: Able to verbalize current knowledge/experience, Able to verbalize/acknowledge new learning, Able to retain information, Capable of insight, Able to change behavior, and Progressing to goal      Endings: None Reported    Modes of Intervention: Education, Support, Socialization, Exploration, Clarifying, Problem-solving, and Activity      Discipline Responsible: Psychoeducational Specialist and Recreational Therapist      Signature:  Romina Fermin MA, CTRS

## 2024-05-21 VITALS
WEIGHT: 132.28 LBS | OXYGEN SATURATION: 97 % | HEART RATE: 72 BPM | RESPIRATION RATE: 16 BRPM | DIASTOLIC BLOOD PRESSURE: 67 MMHG | SYSTOLIC BLOOD PRESSURE: 103 MMHG | HEIGHT: 62 IN | TEMPERATURE: 97.6 F | BODY MASS INDEX: 24.34 KG/M2

## 2024-05-21 PROBLEM — F32.3 CURRENT SEVERE EPISODE OF MAJOR DEPRESSIVE DISORDER WITH PSYCHOTIC FEATURES (HCC): Status: ACTIVE | Noted: 2024-05-21

## 2024-05-21 PROCEDURE — 5130000000 HC BRIDGE APPOINTMENT

## 2024-05-21 PROCEDURE — 6370000000 HC RX 637 (ALT 250 FOR IP): Performed by: PSYCHIATRY & NEUROLOGY

## 2024-05-21 RX ORDER — FLUPHENAZINE HYDROCHLORIDE 10 MG/1
10 TABLET ORAL NIGHTLY
Qty: 30 TABLET | Refills: 0 | Status: SHIPPED | OUTPATIENT
Start: 2024-05-21

## 2024-05-21 RX ORDER — TRAZODONE HYDROCHLORIDE 50 MG/1
50 TABLET ORAL NIGHTLY PRN
Qty: 30 TABLET | Refills: 0 | Status: SHIPPED | OUTPATIENT
Start: 2024-05-21

## 2024-05-21 RX ORDER — ARIPIPRAZOLE 20 MG/1
20 TABLET ORAL DAILY
Qty: 30 TABLET | Refills: 0 | Status: SHIPPED | OUTPATIENT
Start: 2024-05-22

## 2024-05-21 RX ADMIN — ARIPIPRAZOLE 20 MG: 10 TABLET ORAL at 08:27

## 2024-05-21 NOTE — TRANSITION OF CARE
24 hours a day, seven days a week.      Please note that we have a patient family advisory [place] that meets the second Wednesday of January and the second Wednesday of July at 5pm in [facility]. Department leadership would love for you to attend to give feedback on what we are doing well and areas in which we can improve our patient care. Please come if you are able and feel free to reach out to [facility] at [phone] with any questions.     APPOINTMENTS    The following appointments have been made on your behalf. [facility] staff strongly encourages you to keep all appointments. If you need to reschedule, we recommend being seen within two weeks of hospital discharge.    Primary Care Follow-Up Appointment:      Please follow up with your PCP regarding any pending labs.   You do not currently have a primary care provider (PCP). If you would like help getting connected with a PCP, you can contact [facility] for help finding one in your area and getting scheduled for your first appointment.     [facility]  Phone: [phone]       Mental Health/Psychiatry Appointment:     Name of Provider: Emelina Lanza   Provider specialty/license: Psychiatry   Date and time of appointment: 5/21/24 at 10:50 am   The type/s of services requested are: Hospital Follow Up  Agency name: [facility] in [place], [place]   Address: [address] [place] [id number]   Phone Number: [phone]   Special instructions (what to bring to appointment, etc.): Please arrive 15 minutes early. Bring a photo ID, insurance card, and any copays/coinsurance. If you need to cancel or reschedule, please call 24 hours prior to your scheduled time.     * No surgery found *    Results for orders placed or performed during the hospital encounter of 05/11/24   TSH without Reflex   Result Value Ref Range    TSH 3.63 0.27 - 4.20 uIU/mL   Lipid Panel   Result Value Ref Range

## 2024-05-21 NOTE — PROGRESS NOTES
Home Medication Reconciliation Status          [] COMPLETE       Medication history has been reviewed and obtained from the following source(s):       [] patient/family verbal report             [] patient/family provided written list       [] external pharmacy   [] external facility list         []  Provider notified that home medication reconciliation is complete          [x] IN PROGRESS       Medication reconciliation marked in progress at this time due to:       [x] patient/family poor historian      [] waiting arrival of family to clarify       [] waiting for accurate list        [] external pharmacy needs called      * Follow up is needed.          [] UNABLE TO ASSESS       Medication reconciliation is incomplete and unable to assess at this time due to:       [] critical patient condition   [] patient is unresponsive        [] no family available                       [] unknown pharmacy       [] anonymous patient          * Follow up is needed.      [] Pharmacy consult placed for medication reconciliation assistance   Additional comments:    
      Behavioral Services                                              Medicare Re-Certification    I certify that the inpatient psychiatric hospital services furnished since the previous certification/re-certification were, and continue to be, medically necessary for;    [x] (1) Treatment which could reasonably be expected to improve the patient's condition,    [x] (2) Or for diagnostic study.    Estimated length of stay/service 2-5 days    Plan for post-hospital care outpatient support    This patient continues to need, on a daily basis, active treatment furnished directly by or requiring the supervision of inpatient psychiatric personnel.    Electronically signed by HARLAN Johnson CNP on 5/18/2024 at 7:58 PM   
      Behavioral Services  Medicare Certification Upon Admission    I certify that this patient's inpatient psychiatric hospital admission is medically necessary for:    [x] (1) Treatment which could reasonably be expected to improve this patient's condition,       [x] (2) Or for diagnostic study;     AND     [x](2) The inpatient psychiatric services are provided while the individual is under the care of a physician and are included in the individualized plan of care.    Estimated length of stay/service 3 days    Plan for post-hospital care home with OP FU    Electronically signed by HARLAN Bonner CNP on 5/11/2024 at 9:32 PM      
 [facility]  [facility]mission Note     Admission Type:   Admission Type: Involuntary    Reason for admission:  Reason for Admission: suicidal ideation      Addictive Behavior:   Addictive Behavior  In the Past 3 Months, Have You Felt or Has Someone Told You That You Have a Problem With  : None    Medical Problems:   Past Medical History:   Diagnosis Date    Anxiety     Depression     Depression     Schizophrenia (HCC)        Status EXAM:  Mental Status and Behavioral Exam  Normal: No  Level of Assistance: Independent/Self  Facial Expression: Worried  Affect: Constricted  Level of Consciousness: Alert  Frequency of Checks: 4 times per hour, close  Mood:Normal: No  Mood: Anxious  Motor Activity:Normal: Yes  Eye Contact: Good  Observed Behavior: Cooperative, Friendly, Preoccupied  Sexual Misconduct History: Current - no  Preception: Butler to person, Butler to time, Butler to place, Butler to situation  Attention:Normal: No  Attention: Distractible  Thought Processes: Perseveration  Thought Content:Normal: No  Thought Content: Preoccupations  Depression Symptoms: Change in energy level  Anxiety Symptoms: Generalized  Bella Symptoms: Less need to sleep  Hallucinations: Auditory (comment) (sometimes commanding but not present at this time)  Delusions: No  Memory:Normal: No  Memory: Poor recent  Insight and Judgment: No  Insight and Judgment: Poor judgment, Poor insight    Tobacco Screening:  Practical Counseling, on admission, aditi X, if applicable and completed (first 3 are required if patient doesn't refuse):            ( ) Recognizing danger situations (included triggers and roadblocks)                    ( ) Coping skills (new ways to manage stress,relaxation techniques, changing routine, distraction)                                                           ( ) Basic information about quitting (benefits of quitting, techniques in how to quit, available resources  ( ) Referral for counseling faxed to 
0445, patient arrived in the unit from [facility] on stretcher via ambulance accompanied by EMS wearing hospital gown underneath her hijab & abaya. Suicide & constant observation initiated upon arrival due to patient coming in with high risk in her CSSR-S. Patient appears physically well, alert & oriented. Security checked patient for any contraband items & non was found. Belongings secured & labeled. Oriented to the unit & her room. VS checked & recorded all within normal, no fresh injury noted nor reported. Safe environment provided & maintained.   
2018, pt given Trazodone 50mg as PRN per patient request.  
2056, patient states she slept last night but was on & off, Trazodone 50mg given as PRN.   
4 Eyes Skin Assessment     NAME:  Mahesh Youngblood  YOB: 1985  MEDICAL RECORD NUMBER:  [id number]    The patient is being assessed for  Admission    I agree that at least one RN has performed a thorough Head to Toe Skin Assessment on the patient. ALL assessment sites listed below have been assessed.      Areas assessed by both nurses:    Head, Face, Ears, Shoulders, Back, Chest, Arms, Elbows, Hands, Sacrum. Buttock, Coccyx, Ischium, and Legs. Feet and Heels        Does the Patient have a Wound? No noted wound(s)     No fresh injury noted nor reported.  Ricky Prevention initiated by RN: No  Wound Care Orders initiated by RN: No    Pressure Injury (Stage 3,4, Unstageable, DTI, NWPT, and Complex wounds) if present, place Wound referral order by RN under : No    New Ostomies, if present place, Ostomy referral order under : No     Nurse 1 eSignature: Electronically signed by Cindy Au RN on 5/11/24 at 6:40 AM EDT    **SHARE this note so that the co-signing nurse can place an eSignature**    Nurse 2 eSignature: Electronically signed by Gerber Hahn RN on 5/11/24 at 6:43 AM EDT   
Bridge Appointment completed: Reviewed Discharge Instructions with patient.    Patient verbalizes understanding and agreement with the discharge plan using the teachback method.     Vaccinations (aditi X if applicable and completed):  ( ) Patient states already received influenza vaccine elsewhere  ( ) Patient received influenza vaccine during this hospitalization  ( ) Patient refused influenza vaccine at this time  (x ) Not offered   
Brothers report that patient's therapist speaks Serbian and she will continue to follow up with them.  His name is Emelina Carrington.  Tele# [phone].  
Brothers report they had a good visit but do not see significant improvement in her mental status.  They report she is still very unrealistic in her thought process, deciding new things from one minute to the next, making plans that will not likely fit within her means.  They shared that in [place] her psychiatrist felt she had a \"chemical imbalance in her brain.\"  They could not recall the exact diagnosis.  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 They stated that she lashes out at her family, has very rigid imaginary morality that she tries to uphold for herself and others that are not held in even the most Scientologist Islamic religions.  They are \"self-limiting\" ideas that she disguises as Mandaeism practice.  They expressed that they want to be supportive but are finding it harder and harder to know what to do.  Their father is exasperated because she has often said the derogatory commanding voice she hears in her head is him.  The brothers stated, \"We all have to try to work around her, walk on eggshells.\"  They will continue to take turns visiting her and would like to be notified before she is discharged.  
Brothers were concerned that patient will be discharged on her own.  They want to be contacted prior to discharge.  MANJIT is signed and on the chart.  
Checked on patient, sleeping at this time, no signs of distress noted.  
Department of Psychiatry  AttendingProgress Note  Chief Complaint: psychosis   Mahesh was polite and cooperative. She stated that she felt well but she also appeared to be trying to present herself as stable.   She came to group today .   Talked about how she prays 5 times pre day.   Reported to nursing that she was having auditory hallucinations of \"pressure sounds.\"   Tolerating medication  Patient's chart was reviewed and collaborated with  about the treatment plan.  SUBJECTIVE:    Patient is feeling better. Suicidal ideation:  denies suicidal ideation.  Patient does not have medication side effects.    ROS: Patient has new complaints: no  Sleeping adequately:  Yes   Appetite adequate: Yes  Attending groups: Yes  Visitors:Yes    OBJECTIVE    Physical  VITALS:  BP 97/61   Pulse 71   Temp 97.1 °F (36.2 °C) (Temporal)   Resp 16   Ht 1.575 m (5' 2\")   Wt 60 kg (132 lb 4.4 oz)   SpO2 96%   BMI 24.19 kg/m²     Mental Status Examination:  Patients appearance was ill-appearing. Thoughts are Illogical. Homicidal ideations none.  No abnormal movements, tics or mannerisms.  Memory intact Aims 0. Concentration Poor.   Alert and oriented X 4. Insight and Judgement paranoid ideations. Patient was cooperative. Patient gait normal. Mood constricted, affect depressed affect Hallucinations present, suicidal ideations no specific plan to harm self Speech soft  Data  Labs:   Admission on 05/11/2024   Component Date Value Ref Range Status    TSH 05/11/2024 3.63  0.27 - 4.20 uIU/mL Final    Ventricular Rate 05/11/2024 87  BPM Final    Atrial Rate 05/11/2024 87  BPM Final    P-R Interval 05/11/2024 188  ms Final    QRS Duration 05/11/2024 66  ms Final    Q-T Interval 05/11/2024 352  ms Final    QTc Calculation (Bazett) 05/11/2024 423  ms Final    P Axis 05/11/2024 60  degrees Final    R Axis 05/11/2024 12  degrees Final    T Axis 05/11/2024 43  degrees Final    Diagnosis 05/11/2024 Normal sinus rhythmConfirmed by 
Department of Psychiatry  AttendingProgress Note  Chief Complaint: psychosis   Met with Mahesh and interpretor . She was soft spoken and able to communicate in English although interpretor was present to help with processing information.  Mahesh was organized and able to talk about her college work in [employer] that she received in [facility]. Family is from [place]  She talked about her illness starting when she was 17 and was hospitalized in [facility]. She mentioned that Schizophrenia and Depression were past diagnoses. .   Tolerating the Change from Zyprexa to Prolixin. Is currently on Zyprexa 20 mg HS and will decrease to 10 mg tomorrow. For  6 more doses and then DC . Prolixin 1 mg BID. Along with Abilify 30 mg QD.      Patient's chart was reviewed and collaborated with  about the treatment plan.  SUBJECTIVE:    Patient is feeling better. Suicidal ideation:  denies suicidal ideation.  Patient does not have medication side effects.    ROS: Patient has new complaints: no  Sleeping adequately:  Yes   Appetite adequate: Yes  Attending groups: No:   Visitors:No    OBJECTIVE    Physical  VITALS:  /84   Pulse 99   Temp 98.2 °F (36.8 °C) (Oral)   Resp 16   Ht 1.575 m (5' 2\")   Wt 60 kg (132 lb 4.4 oz)   SpO2 97%   BMI 24.19 kg/m²     Mental Status Examination:  Patients appearance was ill-appearing. Thoughts are Paucity of Ideas. Homicidal ideations none.  No abnormal movements, tics or mannerisms.  Memory intact Aims 0. Concentration Fair.   Alert and oriented X 4. Insight and Judgement impaired insight. Patient was cooperative. Patient gait normal. Mood constricted, affect flat affect Hallucinations Absent, suicidal ideations no specific plan to harm self Speech soft  Data  Labs:   Admission on 05/11/2024   Component Date Value Ref Range Status    TSH 05/11/2024 3.63  0.27 - 4.20 uIU/mL Final    Ventricular Rate 05/11/2024 87  BPM Final    Atrial Rate 05/11/2024 87  BPM Final    P-R 
Department of Psychiatry  AttendingProgress Note  Chief Complaint: psychosis  Mahesh remains isolative to room. She was pleasant and cooperative and stated that she continues to hear voices and described them as positive now rather than negative at admission. She made a reference to the voice being an cherelle from Heaven versus from Hell.   Will increase Prolixin 10 mg HS and decrease Abilify 20 mg QD. DC next week  Patient's chart was reviewed and collaborated with  about the treatment plan.  SUBJECTIVE:    Patient is feeling better. Suicidal ideation:  denies suicidal ideation.  Patient does not have medication side effects.    ROS: Patient has new complaints: no  Sleeping adequately:  Yes   Appetite adequate: Yes  Attending groups: No:   Visitors:Yes    OBJECTIVE    Physical  VITALS:  /68   Pulse 75   Temp 98.2 °F (36.8 °C) (Oral)   Resp 16   Ht 1.575 m (5' 2\")   Wt 60 kg (132 lb 4.4 oz)   SpO2 97%   BMI 24.19 kg/m²     Mental Status Examination:  Patients appearance was ill-appearing. Thoughts are Paucity of Ideas. Homicidal ideations none.  No abnormal movements, tics or mannerisms.  Memory intact Aims 0. Concentration Fair.   Alert and oriented X 4. Insight and Judgement impaired insight. Patient was cooperative. Patient gait normal. Mood constricted, affect flat affect Hallucinations present, suicidal ideations no specific plan to harm self Speech normal volume  Data  Labs:   Admission on 05/11/2024   Component Date Value Ref Range Status    TSH 05/11/2024 3.63  0.27 - 4.20 uIU/mL Final    Ventricular Rate 05/11/2024 87  BPM Final    Atrial Rate 05/11/2024 87  BPM Final    P-R Interval 05/11/2024 188  ms Final    QRS Duration 05/11/2024 66  ms Final    Q-T Interval 05/11/2024 352  ms Final    QTc Calculation (Bazett) 05/11/2024 423  ms Final    P Axis 05/11/2024 60  degrees Final    R Axis 05/11/2024 12  degrees Final    T Axis 05/11/2024 43  degrees Final    Diagnosis 05/11/2024 Normal 
Department of Psychiatry  AttendingProgress Note  Chief Complaint: psychosis  Mahesh was in bed reading her Quran. Cooperative, pleasant and easily engaged . Does not appear to have psychotic sxs although she deana that he voices are present but less intense and more positive and she stated that she is not having any SI. Will stop Zyprexa after today and increase Prolixin to 5 mg HS starting 5/15. Will most likely dc tomorrow.  Patient's chart was reviewed and collaborated with  about the treatment plan.  SUBJECTIVE:    Patient is feeling better. Suicidal ideation:  denies suicidal ideation.  Patient does not have medication side effects.    ROS: Patient has new complaints: no  Sleeping adequately:  Yes   Appetite adequate: Yes  Attending groups: no  Visitors:Yes    OBJECTIVE    Physical  VITALS:  BP 99/65   Pulse 84   Temp 98.1 °F (36.7 °C) (Oral)   Resp 14   Ht 1.575 m (5' 2\")   Wt 60 kg (132 lb 4.4 oz)   SpO2 97%   BMI 24.19 kg/m²     Mental Status Examination:  Patients appearance was hospital attire. Thoughts are Goal directed. Homicidal ideations none.  No abnormal movements, tics or mannerisms.  Memory intact Aims 0. Concentration Fair.   Alert and oriented X 4. Insight and Judgement normal insight and judgment. Patient was cooperative. Patient gait normal. Mood within normal limits, affect normal affect Hallucinations Absent, suicidal ideations no specific plan to harm self Speech soft  Data  Labs:   Admission on 05/11/2024   Component Date Value Ref Range Status    TSH 05/11/2024 3.63  0.27 - 4.20 uIU/mL Final    Ventricular Rate 05/11/2024 87  BPM Final    Atrial Rate 05/11/2024 87  BPM Final    P-R Interval 05/11/2024 188  ms Final    QRS Duration 05/11/2024 66  ms Final    Q-T Interval 05/11/2024 352  ms Final    QTc Calculation (Bazett) 05/11/2024 423  ms Final    P Axis 05/11/2024 60  degrees Final    R Axis 05/11/2024 12  degrees Final    T Axis 05/11/2024 43  degrees Final    
Department of Psychiatry  AttendingProgress Note  Chief Complaint: psychosis  Reviewed Meeting with brothers, MATT and Mahesh. Yesterday.  Per Rossy Black    Patient discussed her history of A/H, command in nature, medications that worked Lamictal, her stopping medications when she stabilizes, due to thinking she doesn't need them anymore. She has had the same persecutory, paranoid delusion of the University in [facility] following her to [place] and then to the , still having eyes on her and out to harm her. She has severe anxiety that has risen to the point where she avoids all people/family members and cannot step foot out of the house. She has not been able to go outside of her home in over a month (agoraphobia). Panic attacks and the voices command her to stay inside due to the people watching her and that they want to hurt her.      Patient and her brothers discussed an obsession with getting . She has dating apps and as soon as a man responds to her and says hi, she gets 2 or 3 sentences in and states she wants to  them and they disappear.     Patient was praying when I entered her room. She was quiet and spoke softly.She stated that she felt well. She has limited insight into her illness and appeared to be unwilling to tell me how she was feeling or whether she had psychotic sxs.   Continue current meds.       Patient's chart was reviewed and collaborated with  about the treatment plan.  SUBJECTIVE:    Patient is feeling better. Suicidal ideation:  denies suicidal ideation.  Patient does not have medication side effects.    ROS: Patient has new complaints: no  Sleeping adequately:  Yes   Appetite adequate: Yes  Attending groups: No:   Visitors:Yes    OBJECTIVE    Physical  VITALS:  /70   Pulse 79   Temp 98.4 °F (36.9 °C) (Oral)   Resp 16   Ht 1.575 m (5' 2\")   Wt 60 kg (132 lb 4.4 oz)   SpO2 99%   BMI 24.19 kg/m²     Mental Status Examination:  Patients appearance was 
Mahesh presents to the emergency department today with her brother stating she just had a bad break-up and is now very depressed.  I did gather history from both patient and her brother, whom she lives with.  Chief complaint states \"headache\" however patient denies headache but states her depression is \"hurting my mind\".  Patient continues stating she is very scared and is \"out of my mind\".  Brother states patient and her family just moved here from [place] 1 year ago.  Brother states patient did have some episodes of what he describes as a psychotic break while patient lived in [place].  He states that patient is very scared that that is going to happen again.  Brother states that patient has become very aggressive in the past towards other people and even herself during these episodes and patient states she is very scared this is going to happen again. Patient seems very tearful and upset.  She denies suicidal or homicidal ideations at this time.  Patient continues talking about her past and things that have happened in her past, mostly irrelevant things. Patient has history of schizophrenia, anxiety & depression.     During admission, able to cooperate, noted with pressured speech & unable to concentrate, jumps from topic to topic, main language is Faroese but able to speak some basic english.  Patient denies any active suicidal thoughts but states she's having thoughts about wanting to die & wishes to be dead but no plan nor intent. Denies HI/VH but states hearing \"sounds\" but denies any commanding hallucination. States she wants help & she's been suffering for years now. States she will not harm herself & will inform staff if with any active SI or if at anytime she feels unsafe & not in control. Safe environment was provided & maintained.     
On call KING Connor was notified regarding patient's cultural practice regarding clothing & ordered that patient may use her hijab, abaya & undergarments for skin covering. All clothing was checked for ligature risks nor any contraband but non was found.    
Patient came in from [facility] who then scored high risk for CSSR-S. Suicide & constant observation was initiated upon arrival in the unit. Upon admission, patient assessed for initial suicide screening & scored a low risk. Patient states that she's having thoughts & wishes to be dead but denied any plan nor intent to harm herself. States that she will not harm herself because it is a sin & plans to inform the staff if with any safety risk or at anytime she feels unsafe. On call KING CLAROS was notified thru call at 0500 & suicide & constant observation discontinued at 0517 as ordered. Safe environment provided & maintained.   
Patient came to the station & asked for some orange juice, states not able to sleep, offered PRN & agreed, Trazodone 50mg PO given without any issues. States she wants to fast tomorrow but later on changed her mind & said that she will not fast because her brother said not to while in the hospital & that she is scared her brother will get upset.  
Patient complaining of some increased anxiety.  She is unsure of why she if feeling anxious but states her heart feels like it is racing.  Vitals obtained and WNL.  Hydroxyzine offered and patient was agreeable.    
Patient has been isolative to room this shift. She is pleasant upon approach, cooperative with assessment. She reports feeling of nervousness and sadness. Patient reports auditory hallucinations of \"pressure sounds\". She denies suicidal ideations at this time. No HI/VH. No distress noted. She is compliant with HS meds and received PRN Trazodone for sleep per request.   
Patient has been withdrawn to room this shift. She is cooperative and friendly upon approach. Patient denies any feelings of anxiety or depression. She denies suicidal ideations. No HI/VH. She endorses auditory hallucinations of voices telling her to do positive things such as take a shower or take care of herself. Patient reports she ate two meals today. She did come from room to grab a snack. She is compliant with HS medications. PRN Trazodone given for sleep. No distress noted. Continuing to monitor.   
Patient medications and dosages verified with family.  They provided pharmacy, listed now on Admission med reconciliation.  [facility] was closed by then and medications could not be verified today.  
Patient remains isolative to room, fasting and praying for much of the time.  She reports feeling \"totally back to normal.\"  She states she has spoken to her parents and they are in agreement that she can go back to [place] to live after they have figured out a plan for that. Two of her brothers visited today and report that this is a possibility but they have not spoken to their parents about this.  The brother, Geena shared that historically, patient has long spells of being \"95% normal but then something will happen to cause her to \"spiral down, and become very unrealistic.\"  He states that most recently it most always involves men.  Specifically, he said that she is obsessed with finding a marriage partner.  She will visit [place] dating sites and after only two or three encounters she will offer herself in marriage.  Her brother stated that this is not acceptable in the modern world of [place] relationships.  He will visit with her today and let us know if he thinks she is improving.  Will continue to monitor for safety, provide support as appropriate.  
Patient signed MANJIT for her two brothers Nicho Youngblood ([phone]) and Geena Youngblood ([phone].)   Her parents will visit tomorrow.  She stated late in the shift. \"I will tell my parents tomorrow that I want to move to my Uncle Geena's apartments and work at [employer] and be independent.  I do not want to go home.  I will wait here for the apartment to be available.  I will call my Uncle in the morning.  He will help me.\"  Writer explained that hospital stays are for acute illness and she could not stay simply to wait for an apartment.  She stated \"I will talk to my Uncle.\"  She tries very hard to communicate without , (prefers not to have ) and even spoke to the  often in English.   The  is still needed however, to insure that she understands what we are saying to her.  
Patient slept on & off through out the shift, no any behavioral issues nor any somatic complaints, able to express & ask politely for her needs. All needs attended.  
Patient slept well from around 2200 until 0545, no behavioral nor any somatic complaints made.   
Patient states that she is fasting from sunrise to sunset every other day, states she last fast yesterday. Patient asked if she can fast due to her [employer] practice, this writer discussed with her the importance of taking her medication as she have scheduled medication in the morning. This writer encouraged the patient to discuss with the doctors if possible to adjust the timing of her morning medication. Patient verbalized understanding & agreed not to fast for now until she discussed the medication schedule with the doctors. Patient also states that her brother encouraged her not to fast for now due to her treatment regimen.   
Patient's brother visited today.  He wanted to let the doctor know that he found out what Mahesh's previous medication was.  It was Divalproex SOD  mg tab, 1 tablet po daily at bedtime.  He stated that she took it for approximately 1 month but she did not want to take it anymore because  it made her feel worse.    
Provided Wol[place] Quran to Pt and left message for other  to bring Spiritism prayer rug for Pt.    Adonay Day         05/13/24 1420   Encounter Summary   Encounter Overview/Reason Initial Encounter;Spiritual/Emotional Needs   Service Provided For Patient   Referral/Consult From Rounding   Last Encounter    (5/13 initial, provided Quran)   Complexity of Encounter Low   Begin Time 1400   End Time  1418   Total Time Calculated 18 min       
Pt arrived on the unit via stretcher accompanied by security and transport at 0445. Pt was wanded with metal detector by security with no positive findings. Pt calm and cooperative at this time.   
Pt has been awake and in room this am noted to be praying. She is bright A&O X4 calm and cooperative. Pt denies current SI/HI/VH/AH and agrees to communicate with staff if no longer feel safe or in control. States the voices are completely gone at this time. Discuss d/c plan and support at home pt is looking forward to being back. She did not eat breakfast but reports good sleep. Denies anxiety and denies depression at this time.  
Pt is visible and social on unit this morning. She is bright calm and cooperative and noted to eat breakfast and reports good sleep. Currently she is attending and participating in group.   
Pt visiting with brother. She is calm cooperative and asking about d/c. A&OX4 brightened during 1:1. Pt denies current SI/HI/VH and agrees to communicate with staff if no longer feel safe or in control. Pt does report \"some\" AH but states they are improved and denies they are command. She has been more visible and more social today. Noted to be eating meals. Pt denies anxiety and denies depression.   
Trazodone 50mg & Atarax 50mg given as PRN at 2055 per patient request. States helps her sleep better.  
                  ( ) Basic information about quitting (benefits of quitting, techniques in how to quit, available resources  ( ) [facility]                                                                                                                   (x ) Patient refused counseling  ( ) Patient refused referral  ( ) Patient refused prescription upon discharge  ( x) Patient has not smoked in the last 30 days    Metabolic Screening:    Lab Results   Component Value Date    LABA1C 5.0 05/11/2024       Lab Results   Component Value Date    CHOL 156 05/11/2024     Lab Results   Component Value Date    TRIG 71 05/11/2024     Lab Results   Component Value Date    HDL 64 (H) 05/11/2024     No components found for: \"LDLCAL\"  No components found for: \"LABVLDL\"    Kenyon Mejia RN     
Final    Cholesterol, Total 05/11/2024 156  0 - 199 mg/dL Final    Triglycerides 05/11/2024 71  0 - 150 mg/dL Final    HDL 05/11/2024 64 (H)  40 - 60 mg/dL Final    Comment: An HDL cholesterol less than 40 mg/dL is low and  constitutes a coronary heart disease risk factor.  An HDL cholesterol greater than 60 mg/dL is a  negative risk factor for coronary heart disease.      LDL Cholesterol 05/11/2024 78  <100 mg/dL Final    VLDL Cholesterol Calculated 05/11/2024 14  Not Established mg/dL Final    Hemoglobin A1C 05/11/2024 5.0  See comment % Final    Comment: Comment:  Diagnosis of Diabetes: > or = 6.5%  Increased risk of diabetes (Prediabetes): 5.7-6.4%  Glycemic Control: Nonpregnant Adults: <7.0%                    Pregnant: <6.0%        Estimated Avg Glucose 05/11/2024 96.8  mg/dL Final            Medications  Current Facility-Administered Medications: fluPHENAZine HCl (PROLIXIN) tablet 10 mg, 10 mg, Oral, Nightly  ARIPiprazole (ABILIFY) tablet 20 mg, 20 mg, Oral, Daily  acetaminophen (TYLENOL) tablet 650 mg, 650 mg, Oral, Q4H PRN  ibuprofen (ADVIL;MOTRIN) tablet 400 mg, 400 mg, Oral, Q6H PRN  magnesium hydroxide (MILK OF MAGNESIA) 400 MG/5ML suspension 30 mL, 30 mL, Oral, Daily PRN  nicotine (NICODERM CQ) 21 MG/24HR 1 patch, 1 patch, TransDERmal, Daily  nicotine polacrilex (COMMIT) lozenge 2 mg, 2 mg, Oral, Q1H PRN  aluminum & magnesium hydroxide-simethicone (MAALOX) 200-200-20 MG/5ML suspension 30 mL, 30 mL, Oral, Q6H PRN  OLANZapine (ZYPREXA) tablet 5 mg, 5 mg, Oral, Q4H PRN **OR** OLANZapine (ZyPREXA) 5 mg in sterile water 1 mL injection, 5 mg, IntraMUSCular, Q4H PRN  benztropine mesylate (COGENTIN) injection 2 mg, 2 mg, IntraMUSCular, BID PRN  traZODone (DESYREL) tablet 50 mg, 50 mg, Oral, Nightly PRN  hydrOXYzine HCl (ATARAX) tablet 50 mg, 50 mg, Oral, TID PRN    ASSESSMENT AND PLAN    Principal Problem:    Major depressive disorder, recurrent (HCC)  Active Problems:    Dizziness    Psychosis (HCC)  Resolved 
SI/HI/AVH.  During the assessment with the  it became evident she was relating various movies that she has seen to her actual life, \"I see a movie with [employer] and I know that I have changed my name in the passport 4 times.  This could be a problem.\"  Brothers state that \"when she is like this she becomes very unrealistic.\"  Patient signed MANJIT for these two brothers. (See chart)  Will continue to monitor for safety, provide support as appropriate.  
cholesterol less than 40 mg/dL is low and  constitutes a coronary heart disease risk factor.  An HDL cholesterol greater than 60 mg/dL is a  negative risk factor for coronary heart disease.      LDL Cholesterol 05/11/2024 78  <100 mg/dL Final    VLDL Cholesterol Calculated 05/11/2024 14  Not Established mg/dL Final    Hemoglobin A1C 05/11/2024 5.0  See comment % Final    Comment: Comment:  Diagnosis of Diabetes: > or = 6.5%  Increased risk of diabetes (Prediabetes): 5.7-6.4%  Glycemic Control: Nonpregnant Adults: <7.0%                    Pregnant: <6.0%        Estimated Avg Glucose 05/11/2024 96.8  mg/dL Final            Medications  Current Facility-Administered Medications: fluPHENAZine HCl (PROLIXIN) tablet 10 mg, 10 mg, Oral, Nightly  ARIPiprazole (ABILIFY) tablet 20 mg, 20 mg, Oral, Daily  acetaminophen (TYLENOL) tablet 650 mg, 650 mg, Oral, Q4H PRN  ibuprofen (ADVIL;MOTRIN) tablet 400 mg, 400 mg, Oral, Q6H PRN  magnesium hydroxide (MILK OF MAGNESIA) 400 MG/5ML suspension 30 mL, 30 mL, Oral, Daily PRN  nicotine (NICODERM CQ) 21 MG/24HR 1 patch, 1 patch, TransDERmal, Daily  nicotine polacrilex (COMMIT) lozenge 2 mg, 2 mg, Oral, Q1H PRN  aluminum & magnesium hydroxide-simethicone (MAALOX) 200-200-20 MG/5ML suspension 30 mL, 30 mL, Oral, Q6H PRN  OLANZapine (ZYPREXA) tablet 5 mg, 5 mg, Oral, Q4H PRN **OR** OLANZapine (ZyPREXA) 5 mg in sterile water 1 mL injection, 5 mg, IntraMUSCular, Q4H PRN  benztropine mesylate (COGENTIN) injection 2 mg, 2 mg, IntraMUSCular, BID PRN  traZODone (DESYREL) tablet 50 mg, 50 mg, Oral, Nightly PRN  hydrOXYzine HCl (ATARAX) tablet 50 mg, 50 mg, Oral, TID PRN    ASSESSMENT AND PLAN    Principal Problem:    Major depressive disorder, recurrent (HCC)  Active Problems:    Dizziness    Psychosis (HCC)  Resolved Problems:    * No resolved hospital problems. *       1.Patient s symptoms   are improving  2.Probable discharge is tomorrow  3.Discharge planning is complete  4. Suicidal ideation

## 2024-05-21 NOTE — PLAN OF CARE
Problem: Decision Making  Goal: Pt/Family able to effectively weigh alternatives and participate in decision making related to treatment and care  Description: INTERVENTIONS:  1. Determine when there are differences between patient's view, family's view, and healthcare provider's view of condition  2. Facilitate patient and family articulation of goals for care  3. Help patient and family identify pros/cons of alternative solutions  4. Provide information as requested by patient/family  5. Respect patient/family right to receive or not to receive information  6. Serve as a liaison between patient and family and health care team  7. Initiate Consults from Ethics, Palliative Care or initiate Family Care Conference as is appropriate  Outcome: Progressing     
  Problem: Decision Making  Goal: Pt/Family able to effectively weigh alternatives and participate in decision making related to treatment and care  Description: INTERVENTIONS:  1. Determine when there are differences between patient's view, family's view, and healthcare provider's view of condition  2. Facilitate patient and family articulation of goals for care  3. Help patient and family identify pros/cons of alternative solutions  4. Provide information as requested by patient/family  5. Respect patient/family right to receive or not to receive information  6. Serve as a liaison between patient and family and health care team  7. Initiate Consults from Ethics, Palliative Care or initiate Family Care Conference as is appropriate  Outcome: Progressing     Problem: Depression/Self Harm  Goal: Effect of psychiatric condition will be minimized and patient will be protected from self harm  Description: INTERVENTIONS:  1. Assess impact of patient's symptoms on level of functioning, self care needs and offer support as indicated  2. Assess patient/family knowledge of depression, impact on illness and need for teaching  3. Provide emotional support, presence and reassurance  4. Assess for possible suicidal thoughts or ideation. If patient expresses suicidal thoughts or statements do not leave alone, initiate Suicide Precautions, move to a room close to the nursing station and obtain sitter  5. Initiate consults as appropriate with Mental Health Professional, Spiritual Care, Psychosocial CNS, and consider a recommendation to the LIP for a Psychiatric Consultation  5/16/2024 0813 by Maria R Ley, RN  Outcome: Progressing  5/15/2024 2124 by Maria R Mccormack, RN  Outcome: Progressing     Problem: Confusion  Goal: Confusion, delirium, dementia, or psychosis is improved or at baseline  Description: INTERVENTIONS:  1. Assess for possible contributors to thought disturbance, including medications, impaired vision or 
  Problem: Decision Making  Goal: Pt/Family able to effectively weigh alternatives and participate in decision making related to treatment and care  Description: INTERVENTIONS:  1. Determine when there are differences between patient's view, family's view, and healthcare provider's view of condition  2. Facilitate patient and family articulation of goals for care  3. Help patient and family identify pros/cons of alternative solutions  4. Provide information as requested by patient/family  5. Respect patient/family right to receive or not to receive information  6. Serve as a liaison between patient and family and health care team  7. Initiate Consults from Ethics, Palliative Care or initiate Family Care Conference as is appropriate  Outcome: Progressing     Problem: Depression/Self Harm  Goal: Effect of psychiatric condition will be minimized and patient will be protected from self harm  Description: INTERVENTIONS:  1. Assess impact of patient's symptoms on level of functioning, self care needs and offer support as indicated  2. Assess patient/family knowledge of depression, impact on illness and need for teaching  3. Provide emotional support, presence and reassurance  4. Assess for possible suicidal thoughts or ideation. If patient expresses suicidal thoughts or statements do not leave alone, initiate Suicide Precautions, move to a room close to the nursing station and obtain sitter  5. Initiate consults as appropriate with Mental Health Professional, Spiritual Care, Psychosocial CNS, and consider a recommendation to the LIP for a Psychiatric Consultation  Outcome: Progressing     Problem: Confusion  Goal: Confusion, delirium, dementia, or psychosis is improved or at baseline  Description: INTERVENTIONS:  1. Assess for possible contributors to thought disturbance, including medications, impaired vision or hearing, underlying metabolic abnormalities, dehydration, psychiatric diagnoses, and notify attending LIP  2. 
  Problem: Decision Making  Goal: Pt/Family able to effectively weigh alternatives and participate in decision making related to treatment and care  Description: INTERVENTIONS:  1. Determine when there are differences between patient's view, family's view, and healthcare provider's view of condition  2. Facilitate patient and family articulation of goals for care  3. Help patient and family identify pros/cons of alternative solutions  4. Provide information as requested by patient/family  5. Respect patient/family right to receive or not to receive information  6. Serve as a liaison between patient and family and health care team  7. Initiate Consults from Ethics, Palliative Care or initiate Family Care Conference as is appropriate  Outcome: Progressing  Flowsheets  Taken 5/12/2024 1647  Patient/family able to effectively weigh alternatives and participate in decision making related to treatment and care:   Determine when there are differences between patient's view, family's view, and healthcare provider's view of condition   Facilitate patient and family articulation of goals for care   Help patient and family identify pros/cons of alternative solutions  Taken 5/12/2024 1100  Patient/family able to effectively weigh alternatives and participate in decision making related to treatment and care:   Determine when there are differences between patient's view, family's view, and healthcare provider's view of condition   Facilitate patient and family articulation of goals for care   Help patient and family identify pros/cons of alternative solutions  Note: Pt family desires for pt to feel \"happier\" and less isolative. Her parents plan to visit. Mahesh is constricted and minimizes her desire to remain in the hospital.     Problem: Depression/Self Harm  Goal: Effect of psychiatric condition will be minimized and patient will be protected from self harm  Description: INTERVENTIONS:  1. Assess impact of patient's symptoms on 
  Problem: Depression/Self Harm  Goal: Effect of psychiatric condition will be minimized and patient will be protected from self harm  Description: INTERVENTIONS:  1. Assess impact of patient's symptoms on level of functioning, self care needs and offer support as indicated  2. Assess patient/family knowledge of depression, impact on illness and need for teaching  3. Provide emotional support, presence and reassurance  4. Assess for possible suicidal thoughts or ideation. If patient expresses suicidal thoughts or statements do not leave alone, initiate Suicide Precautions, move to a room close to the nursing station and obtain sitter  5. Initiate consults as appropriate with Mental Health Professional, Spiritual Care, Psychosocial CNS, and consider a recommendation to the LIP for a Psychiatric Consultation  5/15/2024 2124 by Maria R Mccormack RN  Outcome: Progressing     Problem: Confusion  Goal: Confusion, delirium, dementia, or psychosis is improved or at baseline  Description: INTERVENTIONS:  1. Assess for possible contributors to thought disturbance, including medications, impaired vision or hearing, underlying metabolic abnormalities, dehydration, psychiatric diagnoses, and notify attending LIP  2. [place] high risk fall precautions, as indicated  3. Provide frequent short contacts to provide reality reorientation, refocusing and direction  4. Decrease environmental stimuli, including noise as appropriate  5. Monitor and intervene to maintain adequate nutrition, hydration, elimination, sleep and activity  6. If unable to ensure safety without constant attention obtain sitter and review sitter guidelines with assigned personnel  7. Initiate Psychosocial CNS and Spiritual Care consult, as indicated  5/15/2024 2124 by Maria R Mccormack, RN  Outcome: Progressing     Problem: Self Harm/Suicidality  Goal: Will have no self-injury during hospital stay  Description: INTERVENTIONS:  1.  Ensure constant observer at 
  Problem: Depression/Self Harm  Goal: Effect of psychiatric condition will be minimized and patient will be protected from self harm  Description: INTERVENTIONS:  1. Assess impact of patient's symptoms on level of functioning, self care needs and offer support as indicated  2. Assess patient/family knowledge of depression, impact on illness and need for teaching  3. Provide emotional support, presence and reassurance  4. Assess for possible suicidal thoughts or ideation. If patient expresses suicidal thoughts or statements do not leave alone, initiate Suicide Precautions, move to a room close to the nursing station and obtain sitter  5. Initiate consults as appropriate with Mental Health Professional, Spiritual Care, Psychosocial CNS, and consider a recommendation to the LIP for a Psychiatric Consultation  Outcome: Progressing     Problem: Self Harm/Suicidality  Goal: Will have no self-injury during hospital stay  Description: INTERVENTIONS:  1.  Ensure constant observer at bedside with Q15M safety checks  2.  Maintain a safe environment  3.  Secure patient belongings  4.  Ensure family/visitors adhere to safety recommendations  5.  Ensure safety tray has been added to patient's diet order  6.  Every shift and PRN: Re-assess suicidal risk via Frequent Screener    Outcome: Progressing     Problem: Psychosis  Goal: Will report no hallucinations or delusions  Description: INTERVENTIONS:  1. Administer medication as  ordered  2. Assist with reality testing to support increasing orientation  3. Assess if patient's hallucinations or delusions are encouraging self harm or harm to others and intervene as appropriate  Outcome: Progressing     
  Problem: Depression/Self Harm  Goal: Effect of psychiatric condition will be minimized and patient will be protected from self harm  Description: INTERVENTIONS:  1. Assess impact of patient's symptoms on level of functioning, self care needs and offer support as indicated  2. Assess patient/family knowledge of depression, impact on illness and need for teaching  3. Provide emotional support, presence and reassurance  4. Assess for possible suicidal thoughts or ideation. If patient expresses suicidal thoughts or statements do not leave alone, initiate Suicide Precautions, move to a room close to the nursing station and obtain sitter  5. Initiate consults as appropriate with Mental Health Professional, Spiritual Care, Psychosocial CNS, and consider a recommendation to the LIP for a Psychiatric Consultation  Outcome: Progressing  Flowsheets (Taken 5/16/2024 1002 by Maria R Ley RN)  Effect of psychiatric condition will be minimized and patient will be protected from self harm: Assess impact of patient’s symptoms on level of functioning, self care needs and offer support as indicated     Problem: Confusion  Goal: Confusion, delirium, dementia, or psychosis is improved or at baseline  Description: INTERVENTIONS:  1. Assess for possible contributors to thought disturbance, including medications, impaired vision or hearing, underlying metabolic abnormalities, dehydration, psychiatric diagnoses, and notify attending LIP  2. [place] high risk fall precautions, as indicated  3. Provide frequent short contacts to provide reality reorientation, refocusing and direction  4. Decrease environmental stimuli, including noise as appropriate  5. Monitor and intervene to maintain adequate nutrition, hydration, elimination, sleep and activity  6. If unable to ensure safety without constant attention obtain sitter and review sitter guidelines with assigned personnel  7. Initiate Psychosocial CNS and Spiritual Care consult, as 
  Problem: Depression/Self Harm  Goal: Effect of psychiatric condition will be minimized and patient will be protected from self harm  Outcome: Progressing  Flowsheets (Taken 5/11/2024 0624)  Effect of psychiatric condition will be minimized and patient will be protected from self harm:   Assess impact of patient’s symptoms on level of functioning, self care needs and offer support as indicated   Assess patient/family knowledge of depression, impact on illness and need for teaching   Provide emotional support, presence and reassurance   Assess for suicidal thoughts or ideation. If patient expresses suicidal thoughts or statements do not leave alone, initiate Suicide Precautions, move near nurse station, obtain sitter   Initiate consults as appropriate with Mental Health Professional, Spiritual Care, Psychosocial CNS, and consider a recommendation to the LIP for a Psychiatric Consultation     Problem: Self Harm/Suicidality  Goal: Will have no self-injury during hospital stay  Outcome: Progressing  Flowsheets (Taken 5/11/2024 0624)  Will have no self-injury during hospital stay:   Ensure constant observer at bedside with Q15M safety checks   Ensure family/visitors adhere to safety recommendations   Every shift and PRN: Re-assess suicidal risk via Frequent Screener   Ensure safety tray has been added to patient's diet order   Maintain a safe environment   Secure patient belongings     Problem: Psychosis  Goal: Will report no hallucinations or delusions  Outcome: Not Progressing     Problem: Psychosis  Goal: Will report no hallucinations or delusions  Outcome: Not Progressing     
  Problem: Psychosis  Goal: Will report no hallucinations or delusions  Outcome: Not Progressing    Patient moved to room 10. Patient relaxed, Isolative to room throughout shift. Patient out in DA to visit with family. Denies SI/HI via interpretor. Patient reports + AVH, hearing music in the room and seeing a girl with a short skirt and socks sitting in the room via interpretor. Patient had a good visit with her family. Medications reviewed with brother with patient's permission. Accepted breakfast and lunch. Patient sitting in room reading Will continue to monitor.     
  Problem: Self Harm/Suicidality  Goal: Will have no self-injury during hospital stay  Description: INTERVENTIONS:  1.  Ensure constant observer at bedside with Q15M safety checks  2.  Maintain a safe environment  3.  Secure patient belongings  4.  Ensure family/visitors adhere to safety recommendations  5.  Ensure safety tray has been added to patient's diet order  6.  Every shift and PRN: Re-assess suicidal risk via Frequent Screener    Progressing       Problem: Depression/Self Harm  Goal: Effect of psychiatric condition will be minimized and patient will be protected from self harm  Description: INTERVENTIONS:  1. Assess impact of patient's symptoms on level of functioning, self care needs and offer support as indicated  2. Assess patient/family knowledge of depression, impact on illness and need for teaching  3. Provide emotional support, presence and reassurance  4. Assess for possible suicidal thoughts or ideation. If patient expresses suicidal thoughts or statements do not leave alone, initiate Suicide Precautions, move to a room close to the nursing station and obtain sitter  5. Initiate consults as appropriate with Mental Health Professional, Spiritual Care, Psychosocial CNS, and consider a recommendation to the LIP for a Psychiatric Consultation  Outcome: Progressing     Problem: Confusion  Goal: Confusion, delirium, dementia, or psychosis is improved or at baseline  Description: INTERVENTIONS:  1. Assess for possible contributors to thought disturbance, including medications, impaired vision or hearing, underlying metabolic abnormalities, dehydration, psychiatric diagnoses, and notify attending LIP  2. [place] high risk fall precautions, as indicated  3. Provide frequent short contacts to provide reality reorientation, refocusing and direction  4. Decrease environmental stimuli, including noise as appropriate  5. Monitor and intervene to maintain adequate nutrition, hydration, elimination, sleep and 
[facility]  Treatment Team Note  Review Date & Time: 05/18/24  5786    Patient was not in treatment team      Status EXAM:   Mental Status and Behavioral Exam  Normal: No  Level of Assistance: Independent/Self  Facial Expression: Brightened  Affect: Appropriate  Level of Consciousness: Alert  Frequency of Checks: 4 times per hour, close  Mood:Normal: Yes  Mood: Anxious  Motor Activity:Normal: No  Motor Activity: Decreased  Eye Contact: Good  Observed Behavior: Cooperative, Friendly  Sexual Misconduct History: Current - no  Preception: [place] to person, [place] to time, [place] to place, [place] to situation  Attention:Normal: Yes  Attention: Others (comment) (WNL)  Thought Processes: Unremarkable  Thought Content:Normal: Yes  Thought Content: Other (comment)  Depression Symptoms: Isolative (improving since admission)  Anxiety Symptoms: Generalized  [place] Symptoms: No problems reported or observed.  Hallucinations: None (denies)  Delusions: No  Delusions: Other (comment) (none noted)  Memory:Normal: Yes  Memory: Other (comment) (WNL)  Insight and Judgment: No  Insight and Judgment: Poor judgment, Poor insight      Suicide Risk CSSR-S:  1) Within the past month, have you wished you were dead or wished you could go to sleep and not wake up? : Yes  2) Have you actually had any thoughts of killing yourself? : No  3) Have you been thinking about how you might kill yourself? : No  5) Have you started to work out or worked out the details of how to kill yourself? Do you intend to carry out this plan? : No  6) Have you ever done anything, started to do anything, or prepared to do anything to end your life?: No      PLAN/TREATMENT RECOMMENDATIONS UPDATE:   Patient will take medication as prescribed, eat 75% of meals, attend groups, participate in milieu activities, participate in treatment team and care planning for discharge and follow up.           Tracey Walsh RN   
Mahesh has been isolative to room this shift. She is pleasant on approach and reports she is feeling better. Medication compliant and denies current SI/HI/AVH.   Problem: Depression/Self Harm  Goal: Effect of psychiatric condition will be minimized and patient will be protected from self harm  Description: INTERVENTIONS:  1. Assess impact of patient's symptoms on level of functioning, self care needs and offer support as indicated  2. Assess patient/family knowledge of depression, impact on illness and need for teaching  3. Provide emotional support, presence and reassurance  4. Assess for possible suicidal thoughts or ideation. If patient expresses suicidal thoughts or statements do not leave alone, initiate Suicide Precautions, move to a room close to the nursing station and obtain sitter  5. Initiate consults as appropriate with Mental Health Professional, Spiritual Care, Psychosocial CNS, and consider a recommendation to the LIP for a Psychiatric Consultation  5/19/2024 0031 by Celia Rodríguez RN  Outcome: Progressing     Problem: Confusion  Goal: Confusion, delirium, dementia, or psychosis is improved or at baseline  Description: INTERVENTIONS:  1. Assess for possible contributors to thought disturbance, including medications, impaired vision or hearing, underlying metabolic abnormalities, dehydration, psychiatric diagnoses, and notify attending LIP  2. [place] high risk fall precautions, as indicated  3. Provide frequent short contacts to provide reality reorientation, refocusing and direction  4. Decrease environmental stimuli, including noise as appropriate  5. Monitor and intervene to maintain adequate nutrition, hydration, elimination, sleep and activity  6. If unable to ensure safety without constant attention obtain sitter and review sitter guidelines with assigned personnel  7. Initiate Psychosocial CNS and Spiritual Care consult, as indicated  5/19/2024 0031 by Celia Rodríguez, 
Patient appears physically well, alert & oriented, pleasant & cooperative during care, appears in brighter mood, mostly reading Quran & praying in her room. During interaction, pt states still hearing voices but lesser & that the voices are now telling her positive things about her future. States that she saw shadows at some point in her room yesterday but none today. Denies SI/HI, took some snacks & juice. HS meds & PRN Trazodone given without any issues. Safe environment provided & maintained.     Problem: Depression/Self Harm  Goal: Effect of psychiatric condition will be minimized and patient will be protected from self harm  Description: INTERVENTIONS:  1. Assess impact of patient's symptoms on level of functioning, self care needs and offer support as indicated  2. Assess patient/family knowledge of depression, impact on illness and need for teaching  3. Provide emotional support, presence and reassurance  4. Assess for possible suicidal thoughts or ideation. If patient expresses suicidal thoughts or statements do not leave alone, initiate Suicide Precautions, move to a room close to the nursing station and obtain sitter  5. Initiate consults as appropriate with Mental Health Professional, Spiritual Care, Psychosocial CNS, and consider a recommendation to the LIP for a Psychiatric Consultation  5/13/2024 2212 by Cindy Au RN  Outcome: Progressing     Problem: Confusion  Goal: Confusion, delirium, dementia, or psychosis is improved or at baseline  Description: INTERVENTIONS:  1. Assess for possible contributors to thought disturbance, including medications, impaired vision or hearing, underlying metabolic abnormalities, dehydration, psychiatric diagnoses, and notify attending LIP  2. [place] high risk fall precautions, as indicated  3. Provide frequent short contacts to provide reality reorientation, refocusing and direction  4. Decrease environmental stimuli, including noise as 
Patient appears physically well, alert & oriented, pleasant & cooperative during care, isolated mostly to her room due to cultural beliefs of not allowed being around the opposite gender. Patient appears in brighter mood during interaction, states she feels better, no SI/HI/VH but states still having AH telling her bad things but non commanding. States she is fasting per [facility] practice but states that her brother encouraged her not to fast for now due to her medication. Emphasized the importance of taking her medication & pt states understanding. HS meds given without any issues, no PRNs given at this time, safe environment provided & maintained.     Problem: Depression/Self Harm  Goal: Effect of psychiatric condition will be minimized and patient will be protected from self harm  Description: INTERVENTIONS:  1. Assess impact of patient's symptoms on level of functioning, self care needs and offer support as indicated  2. Assess patient/family knowledge of depression, impact on illness and need for teaching  3. Provide emotional support, presence and reassurance  4. Assess for possible suicidal thoughts or ideation. If patient expresses suicidal thoughts or statements do not leave alone, initiate Suicide Precautions, move to a room close to the nursing station and obtain sitter  5. Initiate consults as appropriate with Mental Health Professional, Spiritual Care, Psychosocial CNS, and consider a recommendation to the LIP for a Psychiatric Consultation  5/12/2024 2210 by Cindy Au, RN  Outcome: Progressing     Problem: Risk for Elopement  Goal: Patient will not exit the unit/facility without proper excort  5/12/2024 2210 by Cindy Au, RN  Outcome: Progressing     Problem: Psychosis  Goal: Will report no hallucinations or delusions  Description: INTERVENTIONS:  1. Administer medication as  ordered  2. Assist with reality testing to support increasing orientation  3. 
Patient appears physically well, alert & oriented, pleasant & cooperative, brightened & smiling during interaction. Denies SI/HI/VH, states still hearing voices but decreasing & the voices now are telling her positive things. Patient is hoping to get discharge tomorrow. HS meds given & PRN Trazodone per pt request. Safe environment provided & maintained, no issues at this time.    Problem: Depression/Self Harm  Goal: Effect of psychiatric condition will be minimized and patient will be protected from self harm  Description: INTERVENTIONS:  1. Assess impact of patient's symptoms on level of functioning, self care needs and offer support as indicated  2. Assess patient/family knowledge of depression, impact on illness and need for teaching  3. Provide emotional support, presence and reassurance  4. Assess for possible suicidal thoughts or ideation. If patient expresses suicidal thoughts or statements do not leave alone, initiate Suicide Precautions, move to a room close to the nursing station and obtain sitter  5. Initiate consults as appropriate with Mental Health Professional, Spiritual Care, Psychosocial CNS, and consider a recommendation to the LIP for a Psychiatric Consultation  5/14/2024 2054 by Cindy Au RN  Outcome: Progressing     Problem: Confusion  Goal: Confusion, delirium, dementia, or psychosis is improved or at baseline  Description: INTERVENTIONS:  1. Assess for possible contributors to thought disturbance, including medications, impaired vision or hearing, underlying metabolic abnormalities, dehydration, psychiatric diagnoses, and notify attending LIP  2. [place] high risk fall precautions, as indicated  3. Provide frequent short contacts to provide reality reorientation, refocusing and direction  4. Decrease environmental stimuli, including noise as appropriate  5. Monitor and intervene to maintain adequate nutrition, hydration, elimination, sleep and activity  6. If unable 
Patient appears physically well, alert & oriented, withdrawn to room, pleasant & cooperative during care. Appears in brightened mood during interaction, denies all, looking forward to discharge tomorrow. HS meds & PRNs Trazodone & atarax given per request. Safe environment provided & maintained.      Problem: Depression/Self Harm  Goal: Effect of psychiatric condition will be minimized and patient will be protected from self harm  Description: INTERVENTIONS:  1. Assess impact of patient's symptoms on level of functioning, self care needs and offer support as indicated  2. Assess patient/family knowledge of depression, impact on illness and need for teaching  3. Provide emotional support, presence and reassurance  4. Assess for possible suicidal thoughts or ideation. If patient expresses suicidal thoughts or statements do not leave alone, initiate Suicide Precautions, move to a room close to the nursing station and obtain sitter  5. Initiate consults as appropriate with Mental Health Professional, Spiritual Care, Psychosocial CNS, and consider a recommendation to the LIP for a Psychiatric Consultation  5/21/2024 0335 by Cindy Au RN  Outcome: Progressing     Problem: Confusion  Goal: Confusion, delirium, dementia, or psychosis is improved or at baseline  Description: INTERVENTIONS:  1. Assess for possible contributors to thought disturbance, including medications, impaired vision or hearing, underlying metabolic abnormalities, dehydration, psychiatric diagnoses, and notify attending LIP  2. [place] high risk fall precautions, as indicated  3. Provide frequent short contacts to provide reality reorientation, refocusing and direction  4. Decrease environmental stimuli, including noise as appropriate  5. Monitor and intervene to maintain adequate nutrition, hydration, elimination, sleep and activity  6. If unable to ensure safety without constant attention obtain sitter and review sitter 
Patient has been withdrawn to room most of shift. Rashmia denies current SI/HI/VH. Patient endorses AH at times. Compliant with scheduled Zyprexa. Cooperative with care.   Problem: Depression/Self Harm  Goal: Effect of psychiatric condition will be minimized and patient will be protected from self harm  Description: INTERVENTIONS:  1. Assess impact of patient's symptoms on level of functioning, self care needs and offer support as indicated  2. Assess patient/family knowledge of depression, impact on illness and need for teaching  3. Provide emotional support, presence and reassurance  4. Assess for possible suicidal thoughts or ideation. If patient expresses suicidal thoughts or statements do not leave alone, initiate Suicide Precautions, move to a room close to the nursing station and obtain sitter  5. Initiate consults as appropriate with Mental Health Professional, Spiritual Care, Psychosocial CNS, and consider a recommendation to the LIP for a Psychiatric Consultation  Outcome: Progressing     Problem: Self Harm/Suicidality  Goal: Will have no self-injury during hospital stay  Description: INTERVENTIONS:  1.  Ensure constant observer at bedside with Q15M safety checks  2.  Maintain a safe environment  3.  Secure patient belongings  4.  Ensure family/visitors adhere to safety recommendations  5.  Ensure safety tray has been added to patient's diet order  6.  Every shift and PRN: Re-assess suicidal risk via Frequent Screener    Outcome: Progressing     Problem: Psychosis  Goal: Will report no hallucinations or delusions  Description: INTERVENTIONS:  1. Administer medication as  ordered  2. Assist with reality testing to support increasing orientation  3. Assess if patient's hallucinations or delusions are encouraging self harm or harm to others and intervene as appropriate  Outcome: Progressing     
  Problem: Self Harm/Suicidality  Goal: Will have no self-injury during hospital stay  Description: INTERVENTIONS:  1.  Ensure constant observer at bedside with Q15M safety checks  2.  Maintain a safe environment  3.  Secure patient belongings  4.  Ensure family/visitors adhere to safety recommendations  5.  Ensure safety tray has been added to patient's diet order  6.  Every shift and PRN: Re-assess suicidal risk via Frequent Screener    Outcome: Progressing     Problem: Psychosis  Goal: Will report no hallucinations or delusions  Description: INTERVENTIONS:  1. Administer medication as  ordered  2. Assist with reality testing to support increasing orientation  3. Assess if patient's hallucinations or delusions are encouraging self harm or harm to others and intervene as appropriate  Outcome: Progressing     
5/17/2024 1120)  Effect of psychiatric condition will be minimized and patient will be protected from self harm: Assess impact of patient’s symptoms on level of functioning, self care needs and offer support as indicated  5/16/2024 2222 by Maria R Mccormack RN  Outcome: Progressing  Flowsheets (Taken 5/16/2024 1002 by Maria R Ley RN)  Effect of psychiatric condition will be minimized and patient will be protected from self harm: Assess impact of patient’s symptoms on level of functioning, self care needs and offer support as indicated     Problem: Confusion  Goal: Confusion, delirium, dementia, or psychosis is improved or at baseline  Description: INTERVENTIONS:  1. Assess for possible contributors to thought disturbance, including medications, impaired vision or hearing, underlying metabolic abnormalities, dehydration, psychiatric diagnoses, and notify attending LIP  2. [place] high risk fall precautions, as indicated  3. Provide frequent short contacts to provide reality reorientation, refocusing and direction  4. Decrease environmental stimuli, including noise as appropriate  5. Monitor and intervene to maintain adequate nutrition, hydration, elimination, sleep and activity  6. If unable to ensure safety without constant attention obtain sitter and review sitter guidelines with assigned personnel  7. Initiate Psychosocial CNS and Spiritual Care consult, as indicated  5/17/2024 1127 by Maria R Ley RN  Outcome: Progressing  Flowsheets (Taken 5/17/2024 1120)  Effect of thought disturbance (confusion, delirium, dementia, or psychosis) are managed with adequate functional status: Assess for contributors to thought disturbance, including medications, impaired vision or hearing, underlying metabolic abnormalities, dehydration, psychiatric diagnoses, notify LIP  5/16/2024 2222 by Maria R Mccormack RN  Outcome: Progressing  Flowsheets (Taken 5/16/2024 1002 by Maria R Ley RN)  Effect of thought 
constant attention obtain sitter and review sitter guidelines with assigned personnel  7. Initiate Psychosocial CNS and Spiritual Care consult, as indicated  5/19/2024 2158 by Judy Sampson LPN  Outcome: Progressing     Problem: Pain  Goal: Verbalizes/displays adequate comfort level or baseline comfort level  5/19/2024 2158 by Judy Sampson LPN  Outcome: Progressing     
recommendations  5.  Ensure safety tray has been added to patient's diet order  6.  Every shift and PRN: Re-assess suicidal risk via Frequent Screener    5/18/2024 1207 by Tracey Walsh RN  Outcome: Progressing     Problem: Psychosis  Goal: Will report no hallucinations or delusions  Description: INTERVENTIONS:  1. Administer medication as  ordered  2. Assist with reality testing to support increasing orientation  3. Assess if patient's hallucinations or delusions are encouraging self harm or harm to others and intervene as appropriate  Outcome: Progressing     Problem: Risk for Elopement  Goal: Patient will not exit the unit/facility without proper excort  Outcome: Progressing     Problem: Confusion  Goal: Confusion, delirium, dementia, or psychosis is improved or at baseline  Description: INTERVENTIONS:  1. Assess for possible contributors to thought disturbance, including medications, impaired vision or hearing, underlying metabolic abnormalities, dehydration, psychiatric diagnoses, and notify attending LIP  2. [place] high risk fall precautions, as indicated  3. Provide frequent short contacts to provide reality reorientation, refocusing and direction  4. Decrease environmental stimuli, including noise as appropriate  5. Monitor and intervene to maintain adequate nutrition, hydration, elimination, sleep and activity  6. If unable to ensure safety without constant attention obtain sitter and review sitter guidelines with assigned personnel  7. Initiate Psychosocial CNS and Spiritual Care consult, as indicated  5/18/2024 1207 by Tracey Walsh, RN  Outcome: Progressing     Problem: Pain  Goal: Verbalizes/displays adequate comfort level or baseline comfort level  Outcome: Progressing     Problem: Self Harm/Suicidality  Goal: Will have no self-injury during hospital stay  Description: INTERVENTIONS:  1.  Ensure constant observer at bedside with Q15M safety checks  2.  Maintain a safe environment  3.  
She utilized PRN medication for anxiety which was effective.  She has been able to verbalize her needs to staff and has been cooperative with all care.

## 2024-05-22 ENCOUNTER — FOLLOWUP TELEPHONE ENCOUNTER (OUTPATIENT)
Dept: PSYCHIATRY | Age: 39
End: 2024-05-22

## 2024-05-23 NOTE — DISCHARGE SUMMARY
numbers to contact if suicidal thoughts recur and states pt will return to the hospital if suicidal feelings return.   Hospital Routine Meds:     Hospital PRN Meds:    Discharge Meds:    Discharge Medication List as of 5/21/2024  8:58 AM             Details   fluPHENAZine HCl (PROLIXIN) 10 MG tablet Take 1 tablet by mouth nightly, Disp-30 tablet, R-0Normal      traZODone (DESYREL) 50 MG tablet Take 1 tablet by mouth nightly as needed for Sleep, Disp-30 tablet, R-0Normal                Details   ARIPiprazole (ABILIFY) 20 MG tablet Take 1 tablet by mouth daily, Disp-30 tablet, R-0Normal               Multiple antipsychotics  Prolixin and Abilify    Disposition - Residence Home     Follow Up:  See Discharge Instructions     Oj Ledesma MD  Physician Psychiatry

## 2024-05-28 ENCOUNTER — FOLLOWUP TELEPHONE ENCOUNTER (OUTPATIENT)
Dept: PSYCHIATRY | Age: 39
End: 2024-05-28

## 2024-05-28 ENCOUNTER — HOSPITAL ENCOUNTER (INPATIENT)
Age: 39
LOS: 9 days | Discharge: HOME | DRG: 885 | End: 2024-06-06
Attending: EMERGENCY MEDICINE | Admitting: PSYCHIATRY & NEUROLOGY

## 2024-05-28 DIAGNOSIS — F39 MOOD DISORDER (HCC): Primary | ICD-10-CM

## 2024-05-28 PROBLEM — F32.A DEPRESSION, UNSPECIFIED: Status: ACTIVE | Noted: 2024-05-28

## 2024-05-28 LAB
ALBUMIN SERPL-MCNC: 4.7 G/DL (ref 3.4–5)
ALBUMIN/GLOB SERPL: 1.6 {RATIO} (ref 1.1–2.2)
ALP SERPL-CCNC: 59 U/L (ref 40–129)
ALT SERPL-CCNC: 11 U/L (ref 10–40)
AMPHETAMINES UR QL SCN>1000 NG/ML: NORMAL
ANION GAP SERPL CALCULATED.3IONS-SCNC: 10 MMOL/L (ref 3–16)
APAP SERPL-MCNC: <5 UG/ML (ref 10–30)
AST SERPL-CCNC: 22 U/L (ref 15–37)
BARBITURATES UR QL SCN>200 NG/ML: NORMAL
BASOPHILS # BLD: 0.1 K/UL (ref 0–0.2)
BASOPHILS NFR BLD: 1.8 %
BENZODIAZ UR QL SCN>200 NG/ML: NORMAL
BILIRUB SERPL-MCNC: 0.7 MG/DL (ref 0–1)
BUN SERPL-MCNC: 10 MG/DL (ref 7–20)
CALCIUM SERPL-MCNC: 9.2 MG/DL (ref 8.3–10.6)
CANNABINOIDS UR QL SCN>50 NG/ML: NORMAL
CHLORIDE SERPL-SCNC: 101 MMOL/L (ref 99–110)
CO2 SERPL-SCNC: 24 MMOL/L (ref 21–32)
COCAINE UR QL SCN: NORMAL
CREAT SERPL-MCNC: 0.7 MG/DL (ref 0.6–1.1)
DEPRECATED RDW RBC AUTO: 15 % (ref 12.4–15.4)
DRUG SCREEN COMMENT UR-IMP: NORMAL
EOSINOPHIL # BLD: 0 K/UL (ref 0–0.6)
EOSINOPHIL NFR BLD: 0.3 %
ETHANOLAMINE SERPL-MCNC: NORMAL MG/DL (ref 0–0.08)
FENTANYL SCREEN, URINE: NORMAL
GFR SERPLBLD CREATININE-BSD FMLA CKD-EPI: >90 ML/MIN/{1.73_M2}
GLUCOSE SERPL-MCNC: 89 MG/DL (ref 70–99)
HCG UR QL: NEGATIVE
HCT VFR BLD AUTO: 42.8 % (ref 36–48)
HGB BLD-MCNC: 14.1 G/DL (ref 12–16)
LYMPHOCYTES # BLD: 0.8 K/UL (ref 1–5.1)
LYMPHOCYTES NFR BLD: 9.7 %
MAGNESIUM SERPL-MCNC: 2.1 MG/DL (ref 1.8–2.4)
MCH RBC QN AUTO: 27.5 PG (ref 26–34)
MCHC RBC AUTO-ENTMCNC: 33 G/DL (ref 31–36)
MCV RBC AUTO: 83.3 FL (ref 80–100)
METHADONE UR QL SCN>300 NG/ML: NORMAL
MONOCYTES # BLD: 0.4 K/UL (ref 0–1.3)
MONOCYTES NFR BLD: 4.6 %
NEUTROPHILS # BLD: 6.9 K/UL (ref 1.7–7.7)
NEUTROPHILS NFR BLD: 83.6 %
OPIATES UR QL SCN>300 NG/ML: NORMAL
OXYCODONE UR QL SCN: NORMAL
PCP UR QL SCN>25 NG/ML: NORMAL
PH UR STRIP: 7 [PH]
PLATELET # BLD AUTO: 275 K/UL (ref 135–450)
PMV BLD AUTO: 8.8 FL (ref 5–10.5)
POTASSIUM SERPL-SCNC: 3.5 MMOL/L (ref 3.5–5.1)
PROT SERPL-MCNC: 7.7 G/DL (ref 6.4–8.2)
RBC # BLD AUTO: 5.14 M/UL (ref 4–5.2)
SALICYLATES SERPL-MCNC: 0.4 MG/DL (ref 15–30)
SARS-COV-2 RDRP RESP QL NAA+PROBE: NOT DETECTED
SODIUM SERPL-SCNC: 135 MMOL/L (ref 136–145)
WBC # BLD AUTO: 8.2 K/UL (ref 4–11)

## 2024-05-28 PROCEDURE — 80143 DRUG ASSAY ACETAMINOPHEN: CPT

## 2024-05-28 PROCEDURE — 6370000000 HC RX 637 (ALT 250 FOR IP): Performed by: STUDENT IN AN ORGANIZED HEALTH CARE EDUCATION/TRAINING PROGRAM

## 2024-05-28 PROCEDURE — 80179 DRUG ASSAY SALICYLATE: CPT

## 2024-05-28 PROCEDURE — 80053 COMPREHEN METABOLIC PANEL: CPT

## 2024-05-28 PROCEDURE — 82077 ASSAY SPEC XCP UR&BREATH IA: CPT

## 2024-05-28 PROCEDURE — 99285 EMERGENCY DEPT VISIT HI MDM: CPT

## 2024-05-28 PROCEDURE — 83735 ASSAY OF MAGNESIUM: CPT

## 2024-05-28 PROCEDURE — 84703 CHORIONIC GONADOTROPIN ASSAY: CPT

## 2024-05-28 PROCEDURE — 36415 COLL VENOUS BLD VENIPUNCTURE: CPT

## 2024-05-28 PROCEDURE — 85025 COMPLETE CBC W/AUTO DIFF WBC: CPT

## 2024-05-28 PROCEDURE — 1240000000 HC EMOTIONAL WELLNESS R&B

## 2024-05-28 PROCEDURE — 80307 DRUG TEST PRSMV CHEM ANLYZR: CPT

## 2024-05-28 PROCEDURE — 87635 SARS-COV-2 COVID-19 AMP PRB: CPT

## 2024-05-28 RX ORDER — POLYETHYLENE GLYCOL 3350 17 G
2 POWDER IN PACKET (EA) ORAL
Status: DISCONTINUED | OUTPATIENT
Start: 2024-05-28 | End: 2024-06-06 | Stop reason: HOSPADM

## 2024-05-28 RX ORDER — HYDROXYZINE 50 MG/1
50 TABLET, FILM COATED ORAL 3 TIMES DAILY PRN
Status: DISCONTINUED | OUTPATIENT
Start: 2024-05-28 | End: 2024-06-06 | Stop reason: HOSPADM

## 2024-05-28 RX ORDER — ARIPIPRAZOLE 10 MG/1
20 TABLET ORAL DAILY
Status: DISCONTINUED | OUTPATIENT
Start: 2024-05-28 | End: 2024-05-29

## 2024-05-28 RX ORDER — TRAZODONE HYDROCHLORIDE 50 MG/1
50 TABLET ORAL NIGHTLY PRN
Status: DISCONTINUED | OUTPATIENT
Start: 2024-05-28 | End: 2024-06-06 | Stop reason: HOSPADM

## 2024-05-28 RX ORDER — ACETAMINOPHEN 325 MG/1
650 TABLET ORAL EVERY 8 HOURS PRN
Status: DISCONTINUED | OUTPATIENT
Start: 2024-05-28 | End: 2024-06-06 | Stop reason: HOSPADM

## 2024-05-28 RX ADMIN — ARIPIPRAZOLE 20 MG: 10 TABLET ORAL at 12:05

## 2024-05-28 ASSESSMENT — PATIENT HEALTH QUESTIONNAIRE - PHQ9
7. TROUBLE CONCENTRATING ON THINGS, SUCH AS READING THE NEWSPAPER OR WATCHING TELEVISION: NOT AT ALL
1. LITTLE INTEREST OR PLEASURE IN DOING THINGS: SEVERAL DAYS
4. FEELING TIRED OR HAVING LITTLE ENERGY: MORE THAN HALF THE DAYS
2. FEELING DOWN, DEPRESSED OR HOPELESS: SEVERAL DAYS
SUM OF ALL RESPONSES TO PHQ QUESTIONS 1-9: 8
SUM OF ALL RESPONSES TO PHQ9 QUESTIONS 1 & 2: 2
9. THOUGHTS THAT YOU WOULD BE BETTER OFF DEAD, OR OF HURTING YOURSELF: NOT AT ALL
6. FEELING BAD ABOUT YOURSELF - OR THAT YOU ARE A FAILURE OR HAVE LET YOURSELF OR YOUR FAMILY DOWN: SEVERAL DAYS
8. MOVING OR SPEAKING SO SLOWLY THAT OTHER PEOPLE COULD HAVE NOTICED. OR THE OPPOSITE, BEING SO FIGETY OR RESTLESS THAT YOU HAVE BEEN MOVING AROUND A LOT MORE THAN USUAL: NOT AT ALL
10. IF YOU CHECKED OFF ANY PROBLEMS, HOW DIFFICULT HAVE THESE PROBLEMS MADE IT FOR YOU TO DO YOUR WORK, TAKE CARE OF THINGS AT HOME, OR GET ALONG WITH OTHER PEOPLE: SOMEWHAT DIFFICULT
SUM OF ALL RESPONSES TO PHQ QUESTIONS 1-9: 8
3. TROUBLE FALLING OR STAYING ASLEEP: NEARLY EVERY DAY
5. POOR APPETITE OR OVEREATING: NOT AT ALL

## 2024-05-28 ASSESSMENT — LIFESTYLE VARIABLES
HOW OFTEN DO YOU HAVE A DRINK CONTAINING ALCOHOL: NEVER
HOW MANY STANDARD DRINKS CONTAINING ALCOHOL DO YOU HAVE ON A TYPICAL DAY: PATIENT DOES NOT DRINK
HOW OFTEN DO YOU HAVE A DRINK CONTAINING ALCOHOL: NEVER
HOW MANY STANDARD DRINKS CONTAINING ALCOHOL DO YOU HAVE ON A TYPICAL DAY: PATIENT DOES NOT DRINK

## 2024-05-28 ASSESSMENT — SLEEP AND FATIGUE QUESTIONNAIRES
AVERAGE NUMBER OF SLEEP HOURS: 5
DO YOU HAVE DIFFICULTY SLEEPING: YES
SLEEP PATTERN: DISTURBED/INTERRUPTED SLEEP;DIFFICULTY FALLING ASLEEP
DO YOU USE A SLEEP AID: NO

## 2024-05-28 ASSESSMENT — PAIN SCALES - GENERAL
PAINLEVEL_OUTOF10: 0
PAINLEVEL_OUTOF10: 0

## 2024-05-28 NOTE — ED NOTES
Receiving phone call from pt's brother asking for an update. Obtained pt's verbal permission to share information. Notified that pt is still in the ED but has been assigned a bed in the I. LPC encouraged family to contact ED/BHI prior to coming for visitation to make sure she's upstairs and can have visitors.

## 2024-05-28 NOTE — VIRTUAL HEALTH
Mahesh Queenbhanu  [id number]  1985     Social Work Behavioral Health Crisis Assessment    05/28/24    Chief Complaint: schizophrenia, delusions    HPI: Patient is a 38 y.o. Other female who presents for hallucinations. Patient presented to the ED on 05/28/24 from home.    Past Psychiatric History:  Previous Diagnoses/symptoms: Depression  Previous suicide attempts/self-harm: Denies  Inpatient psychiatric hospitalizations: yes  Current outpatient psychiatric provider: yes  Current therapist: States not in therapy  Previous psychiatric medication trials: No prior medication trials  Current psychiatric medications: taking medications as prescribed  Family Psychiatric History: Denies    Sleep Hours: 8    Sleep concerns: denies    Use of sleep medications: denies    Substance Abuse History:  Tobacco: Denies  Alcohol: Denies  Marijuana: Denies  Stimulant: Denies  Opiates: Denies  Benzodiazepine: Denies  Other illicit drug usage: Denies  History of substance/alcohol abuse treatment: Denies    Social History:  Education: H.S.  Living Situation/Interest: with family  Marital/Committed relationship and parenting hx: single  Occupation: Unemployed  Legal History/Hx of Violence: Denies  Spiritual History: Denies  Psychological trauma, neglect, or abuse: denies hx of trauma/abuse   Access to guns or other weapons: denies having access to firearms/dangerous weapons     Past Medical History:  Active Ambulatory Problems     Diagnosis Date Noted    Major depressive disorder, recurrent ([facility]) 05/11/2024    Dizziness 05/11/2024    Psychosis ([facility]) 05/11/2024    Current severe episode of major depressive disorder with psychotic features ([facility]) 05/21/2024     Resolved Ambulatory Problems     Diagnosis Date Noted    No Resolved Ambulatory Problems     Past Medical History:   Diagnosis Date    Anxiety     Depression     Depression     Schizophrenia ([facility])      Allergies:  No Known Allergies   Medications:  No current facility-administered

## 2024-05-28 NOTE — CARE COORDINATION
Clinician met with the patient who was cooperative answering the questions.    Rossysalinas Black, MSW    05/28/24 8155   Suicidal Ideation   Wish to be Dead Lifetime - Yes;Past 1 month - Yes   Non-Specific Active Suicidal Thoughts Lifetime - Yes;Past 1 month - Yes   Suicidal Behavior Trigger Wish to be Dead   Active Suicidal Ideation with Any Methods (Not Plan) without Intent to Act Past 1 month - Yes;Lifetime - Yes   Active Suicidal Ideation with Some Intent to Act, without Specific Plan Past 1 month - No;Lifetime - No   Active Suicidal Ideation with Specific Plan and Intent Past 1 month - No;Lifetime- No   Intensity of Ideation   Lifetime - Most Severe Ideation 4   Lifetime - Most Recent Ideation 3   Frequency Less than once a week   Duration Fleeting - few seconds or minutes   Controllability Easily able to control thoughts   Deterrents Does not apply   Reasons for Ideation Does not apply   Suicidal Behavior   Actual Attempt Lifetime - Yes;Past 3 months - No   Total # of Attempts 2   Has subject engaged in Non-Suicidal Self-Injurious Behavior? Lifetime - No;Past 3 months - No   Interrupted Attempt Lifetime - No;Past 3 months - No   Total # of interrupted 0   Aborted or Self-Interrupted Attempt Lifetime - No;Past 3 months - No   Total # of aborted or self-interrupted 0   Preparatory Acts or Behavior Lifetime - No;Past 3 months - No   Total # of Preparatory Acts 0   Actual Lethality/Medical Damage 0   Potential Lethality 0   Most Recent Attempt Date   (Patient stated she attempted years ago and has had ideation but not attempted in many years.)   Most Lethal Attempt Date   (Patient stated she attempted years ago and has had ideation but not attempted in many years.)   Initial/First Attempt Date   (Patient stated she attempted years ago and has had ideation but not attempted in many years.)

## 2024-05-28 NOTE — ED PROVIDER NOTES
Notable for the following components:    Sodium 135 (*)     All other components within normal limits   SALICYLATE LEVEL - Abnormal; Notable for the following components:    Salicylate, Serum 0.4 (*)     All other components within normal limits   ACETAMINOPHEN LEVEL - Abnormal; Notable for the following components:    Acetaminophen Level <5 (*)     All other components within normal limits   COVID-19, RAPID   ETHANOL   URINE DRUG SCREEN   MAGNESIUM   PREGNANCY, URINE       When ordered only abnormal lab results are displayed. All other labs were within normal range or not returned as of this dictation.    RADIOLOGY:     Non-plain film images such as CT, Ultrasound and MRI are read by the radiologist. Plain radiographic images personally reviewed.     Interpretation per the Radiologist below, if available at the time of this note:  No orders to display     No results found.      Bedside Ultrasound, as interpreted by me, if performed:    No results found.    PROCEDURES     Unless otherwise noted below, none     Procedures    CRITICAL CARE TIME     I personally spent a total of 0 minutes of critical care time in obtaining history, performing a physical exam, bedside monitoring of interventions, collecting and interpreting tests and discussion with consultants but excluding time spent performing procedures, treating other patients and teaching time.                                                                                                         EMERGENCY DEPARTMENT COURSE and DIFFERENTIAL DIAGNOSIS/MDM:     Patient seen and evaluated. At presentation, patient was awake, alert, afebrile, hemodynamically stable, and satting well on room air.  I spoke with the patient who reports she was recently admitted to psychiatry here. Says since getting on new medications, the auditory hallucinations has lessened.     I spoke with the family to obtain further history.  Per the family, patient was found roaming around the

## 2024-05-29 PROBLEM — F20.3 UNDIFFERENTIATED SCHIZOPHRENIA (HCC): Status: ACTIVE | Noted: 2024-05-29

## 2024-05-29 PROCEDURE — 6370000000 HC RX 637 (ALT 250 FOR IP): Performed by: PSYCHIATRY & NEUROLOGY

## 2024-05-29 PROCEDURE — 1240000000 HC EMOTIONAL WELLNESS R&B

## 2024-05-29 PROCEDURE — 6370000000 HC RX 637 (ALT 250 FOR IP): Performed by: STUDENT IN AN ORGANIZED HEALTH CARE EDUCATION/TRAINING PROGRAM

## 2024-05-29 RX ORDER — MAGNESIUM HYDROXIDE/ALUMINUM HYDROXICE/SIMETHICONE 120; 1200; 1200 MG/30ML; MG/30ML; MG/30ML
30 SUSPENSION ORAL EVERY 6 HOURS PRN
Status: DISCONTINUED | OUTPATIENT
Start: 2024-05-29 | End: 2024-06-06 | Stop reason: HOSPADM

## 2024-05-29 RX ORDER — ARIPIPRAZOLE 10 MG/1
10 TABLET ORAL DAILY
Status: DISCONTINUED | OUTPATIENT
Start: 2024-05-30 | End: 2024-06-03

## 2024-05-29 RX ORDER — OLANZAPINE 10 MG/2ML
10 INJECTION, POWDER, FOR SOLUTION INTRAMUSCULAR EVERY 8 HOURS PRN
Status: DISCONTINUED | OUTPATIENT
Start: 2024-05-29 | End: 2024-06-06 | Stop reason: HOSPADM

## 2024-05-29 RX ORDER — FLUPHENAZINE HYDROCHLORIDE 10 MG/1
10 TABLET ORAL NIGHTLY
Status: DISCONTINUED | OUTPATIENT
Start: 2024-05-29 | End: 2024-06-06 | Stop reason: HOSPADM

## 2024-05-29 RX ORDER — OLANZAPINE 10 MG/1
10 TABLET ORAL EVERY 8 HOURS PRN
Status: DISCONTINUED | OUTPATIENT
Start: 2024-05-29 | End: 2024-06-06 | Stop reason: HOSPADM

## 2024-05-29 RX ADMIN — ARIPIPRAZOLE 20 MG: 10 TABLET ORAL at 08:39

## 2024-05-29 RX ADMIN — FLUPHENAZINE HYDROCHLORIDE 10 MG: 10 TABLET ORAL at 21:08

## 2024-05-29 RX ADMIN — TRAZODONE HYDROCHLORIDE 50 MG: 50 TABLET ORAL at 21:08

## 2024-05-29 ASSESSMENT — PAIN SCALES - GENERAL
PAINLEVEL_OUTOF10: 0
PAINLEVEL_OUTOF10: 0
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
## 2024-05-29 NOTE — H&P
Patient has been seen and evaluated within 30 days by IM provider and medically cleared for admission to [facility]. Please refer to medical H&P from 5/11/2024.    Vitals reviewed and stable. Labs reviewed and nothing to follow. Orders reviewed.    Please contact with IM provider with concerns.    HALLIE Montalvo  05/29/24  9:50 AM

## 2024-05-30 PROCEDURE — 1240000000 HC EMOTIONAL WELLNESS R&B

## 2024-05-30 PROCEDURE — 6370000000 HC RX 637 (ALT 250 FOR IP): Performed by: PSYCHIATRY & NEUROLOGY

## 2024-05-30 RX ADMIN — FLUPHENAZINE HYDROCHLORIDE 10 MG: 10 TABLET ORAL at 20:44

## 2024-05-30 RX ADMIN — ARIPIPRAZOLE 10 MG: 10 TABLET ORAL at 09:41

## 2024-05-30 RX ADMIN — TRAZODONE HYDROCHLORIDE 50 MG: 50 TABLET ORAL at 20:44

## 2024-05-30 ASSESSMENT — PAIN SCALES - GENERAL: PAINLEVEL_OUTOF10: 0

## 2024-05-30 NOTE — GROUP NOTE
Group Therapy Note    Date: 5/30/2024    Group Start Time: 1000  Group End Time: 1045  Group Topic: Psychoeducation    [facility] OP Miri Diallo MSW        Group Therapy Note    Attendees: 9    Facilitator led a psychoeducation group that discussed locus of control and the importance of focusing energy on areas of influence. Patients were presented with an example locus of control map and discussed the difference between the three circles of control (no direct control, some control, and most control). Patients then created personalized locus of control maps and discussed them as a group for feedback. Group members concluded by answering a reflective question about their maps.         Notes:  Patient was engaged in group discussion and completed locus of control map.      Status After Intervention:  Unchanged    Participation Level: Active Listener and Interactive    Participation Quality: Appropriate, Attentive, and Sharing      Speech:  normal      Thought Process/Content: Logical  Linear      Affective Functioning: Congruent      Mood: euthymic      Level of consciousness:  Alert, Oriented x4, and Attentive      Response to Learning: Able to verbalize current knowledge/experience, Able to verbalize/acknowledge new learning, Able to retain information, Capable of insight, Able to change behavior, and Progressing to goal      Endings: None Reported    Modes of Intervention: Education and Exploration      Discipline Responsible: /Counselor      Signature:  ANUSHA Robles

## 2024-05-31 PROCEDURE — 6370000000 HC RX 637 (ALT 250 FOR IP): Performed by: PSYCHIATRY & NEUROLOGY

## 2024-05-31 PROCEDURE — 1240000000 HC EMOTIONAL WELLNESS R&B

## 2024-05-31 RX ADMIN — ARIPIPRAZOLE 10 MG: 10 TABLET ORAL at 10:40

## 2024-05-31 RX ADMIN — FLUPHENAZINE HYDROCHLORIDE 10 MG: 10 TABLET ORAL at 20:59

## 2024-05-31 RX ADMIN — TRAZODONE HYDROCHLORIDE 50 MG: 50 TABLET ORAL at 20:59

## 2024-05-31 ASSESSMENT — PAIN SCALES - GENERAL
PAINLEVEL_OUTOF10: 0
PAINLEVEL_OUTOF10: 0
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
## 2024-06-01 PROCEDURE — 1240000000 HC EMOTIONAL WELLNESS R&B

## 2024-06-01 PROCEDURE — 6370000000 HC RX 637 (ALT 250 FOR IP): Performed by: PSYCHIATRY & NEUROLOGY

## 2024-06-01 RX ADMIN — ARIPIPRAZOLE 10 MG: 10 TABLET ORAL at 11:31

## 2024-06-01 RX ADMIN — FLUPHENAZINE HYDROCHLORIDE 10 MG: 10 TABLET ORAL at 20:53

## 2024-06-01 RX ADMIN — TRAZODONE HYDROCHLORIDE 50 MG: 50 TABLET ORAL at 20:53

## 2024-06-02 PROCEDURE — 6370000000 HC RX 637 (ALT 250 FOR IP): Performed by: PSYCHIATRY & NEUROLOGY

## 2024-06-02 PROCEDURE — 1240000000 HC EMOTIONAL WELLNESS R&B

## 2024-06-02 RX ADMIN — TRAZODONE HYDROCHLORIDE 50 MG: 50 TABLET ORAL at 20:51

## 2024-06-02 RX ADMIN — ARIPIPRAZOLE 10 MG: 10 TABLET ORAL at 08:35

## 2024-06-02 RX ADMIN — FLUPHENAZINE HYDROCHLORIDE 10 MG: 10 TABLET ORAL at 20:51

## 2024-06-03 PROCEDURE — 6370000000 HC RX 637 (ALT 250 FOR IP): Performed by: PSYCHIATRY & NEUROLOGY

## 2024-06-03 PROCEDURE — 1240000000 HC EMOTIONAL WELLNESS R&B

## 2024-06-03 RX ORDER — FLUPHENAZINE HYDROCHLORIDE 5 MG/1
5 TABLET ORAL DAILY
Status: DISCONTINUED | OUTPATIENT
Start: 2024-06-04 | End: 2024-06-06 | Stop reason: HOSPADM

## 2024-06-03 RX ORDER — ARIPIPRAZOLE 10 MG/1
5 TABLET ORAL DAILY
Status: DISCONTINUED | OUTPATIENT
Start: 2024-06-04 | End: 2024-06-04

## 2024-06-03 RX ADMIN — ARIPIPRAZOLE 10 MG: 10 TABLET ORAL at 09:04

## 2024-06-03 RX ADMIN — FLUPHENAZINE HYDROCHLORIDE 10 MG: 10 TABLET ORAL at 20:35

## 2024-06-03 ASSESSMENT — PAIN SCALES - GENERAL
PAINLEVEL_OUTOF10: 0
PAINLEVEL_OUTOF10: 0

## 2024-06-04 PROCEDURE — 6370000000 HC RX 637 (ALT 250 FOR IP): Performed by: PSYCHIATRY & NEUROLOGY

## 2024-06-04 PROCEDURE — 1240000000 HC EMOTIONAL WELLNESS R&B

## 2024-06-04 RX ADMIN — TRAZODONE HYDROCHLORIDE 50 MG: 50 TABLET ORAL at 20:48

## 2024-06-04 RX ADMIN — FLUPHENAZINE HYDROCHLORIDE 5 MG: 5 TABLET ORAL at 11:32

## 2024-06-04 RX ADMIN — FLUPHENAZINE HYDROCHLORIDE 10 MG: 10 TABLET ORAL at 20:48

## 2024-06-04 RX ADMIN — ARIPIPRAZOLE 5 MG: 10 TABLET ORAL at 11:32

## 2024-06-04 ASSESSMENT — PAIN SCALES - GENERAL
PAINLEVEL_OUTOF10: 0
PAINLEVEL_OUTOF10: 0

## 2024-06-05 PROCEDURE — 6370000000 HC RX 637 (ALT 250 FOR IP): Performed by: PSYCHIATRY & NEUROLOGY

## 2024-06-05 PROCEDURE — 1240000000 HC EMOTIONAL WELLNESS R&B

## 2024-06-05 RX ADMIN — FLUPHENAZINE HYDROCHLORIDE 5 MG: 5 TABLET ORAL at 10:21

## 2024-06-05 RX ADMIN — FLUPHENAZINE HYDROCHLORIDE 10 MG: 10 TABLET ORAL at 21:04

## 2024-06-05 RX ADMIN — TRAZODONE HYDROCHLORIDE 50 MG: 50 TABLET ORAL at 21:04

## 2024-06-05 ASSESSMENT — PAIN SCALES - GENERAL: PAINLEVEL_OUTOF10: 0

## 2024-06-06 VITALS
HEIGHT: 62 IN | HEART RATE: 74 BPM | DIASTOLIC BLOOD PRESSURE: 71 MMHG | RESPIRATION RATE: 16 BRPM | SYSTOLIC BLOOD PRESSURE: 106 MMHG | TEMPERATURE: 98.2 F | OXYGEN SATURATION: 95 % | BODY MASS INDEX: 24.34 KG/M2 | WEIGHT: 132.28 LBS

## 2024-06-06 PROCEDURE — 5130000000 HC BRIDGE APPOINTMENT

## 2024-06-06 PROCEDURE — 6370000000 HC RX 637 (ALT 250 FOR IP): Performed by: PSYCHIATRY & NEUROLOGY

## 2024-06-06 RX ORDER — FLUPHENAZINE HYDROCHLORIDE 5 MG/1
5 TABLET ORAL DAILY
Qty: 30 TABLET | Refills: 0 | Status: SHIPPED | OUTPATIENT
Start: 2024-06-07

## 2024-06-06 RX ORDER — FLUPHENAZINE HYDROCHLORIDE 10 MG/1
10 TABLET ORAL NIGHTLY
Qty: 30 TABLET | Refills: 0 | Status: SHIPPED | OUTPATIENT
Start: 2024-06-06

## 2024-06-06 RX ADMIN — FLUPHENAZINE HYDROCHLORIDE 5 MG: 5 TABLET ORAL at 10:07

## 2024-06-06 NOTE — PROGRESS NOTES
Behavioral Services                                              Medicare Re-Certification    I certify that the inpatient psychiatric hospital services furnished since the previous certification/re-certification were, and continue to be, medically necessary for;    [x] (1) Treatment which could reasonably be expected to improve the patient's condition,    [x] (2) Or for diagnostic study.    Estimated length of stay/service 22 d    Plan for post-hospital care outpt    This patient continues to need, on a daily basis, active treatment furnished directly by or requiring the supervision of inpatient psychiatric personnel.    Electronically signed by FREDO SAUL MD on 6/4/2024 at 1:05 PM   
      Behavioral Services  Medicare Certification Upon Admission    I certify that this patient's inpatient psychiatric hospital admission is medically necessary for:    [x] (1) Treatment which could reasonably be expected to improve this patient's condition,       [x] (2) Or for diagnostic study;     AND     [x](2) The inpatient psychiatric services are provided while the individual is under the care of a physician and are included in the individualized plan of care.    Estimated length of stay/service 7 d    Plan for post-hospital care outpt    Electronically signed by FREDO SAUL MD on 5/29/2024 at 11:10 AM      
Bridge Appointment completed: Reviewed Discharge Instructions with patient.    Patient verbalizes understanding and agreement with the discharge plan using the teachback method.     Vaccinations (aditi X if applicable and completed):  ( ) Patient states already received influenza vaccine elsewhere  ( ) Patient received influenza vaccine during this hospitalization  ( ) Patient refused influenza vaccine at this time  ( x) Not offered   
Department of Psychiatry  AttendingProgress Note  Chief Complaint: psychosis  Mahesh stated that she was feeling better . She has minimal voices. Had visitors.   Is looking forward to going home. She has been cooperative .   Tolerated the reduction in Abilify to 5 mg QD. Will DC tomorrow. Continue Prolixin 5 mg AM and 10 mg HS  Patient's chart was reviewed and collaborated with  about the treatment plan.  SUBJECTIVE:    Patient is feeling better. Suicidal ideation:  denies suicidal ideation.  Patient does not have medication side effects.    ROS: Patient has new complaints: no  Sleeping adequately:  Yes   Appetite adequate: Yes  Attending groups: Yes  Visitors:No    OBJECTIVE    Physical  VITALS:  BP (!) 101/59   Pulse 79   Temp 98.1 °F (36.7 °C) (Oral)   Resp 16   Ht 1.575 m (5' 2.01\")   Wt 60 kg (132 lb 4.4 oz)   SpO2 96%   BMI 24.19 kg/m²     Mental Status Examination:  Patients appearance was hospital attire. Thoughts are Paucity of Ideas. Homicidal ideations none.  No abnormal movements, tics or mannerisms.  Memory intact Aims 0. Concentration Poor.   Alert and oriented X 4. Insight and Judgement impaired insight. Patient was cooperative. Patient gait normal. Mood constricted, affect flat affect Hallucinations Absent, suicidal ideations no specific plan to harm self Speech soft  Data  Labs:   Admission on 05/28/2024   Component Date Value Ref Range Status    WBC 05/28/2024 8.2  4.0 - 11.0 K/uL Final    RBC 05/28/2024 5.14  4.00 - 5.20 M/uL Final    Hemoglobin 05/28/2024 14.1  12.0 - 16.0 g/dL Final    Hematocrit 05/28/2024 42.8  36.0 - 48.0 % Final    MCV 05/28/2024 83.3  80.0 - 100.0 fL Final    MCH 05/28/2024 27.5  26.0 - 34.0 pg Final    MCHC 05/28/2024 33.0  31.0 - 36.0 g/dL Final    RDW 05/28/2024 15.0  12.4 - 15.4 % Final    Platelets 05/28/2024 275  135 - 450 K/uL Final    MPV 05/28/2024 8.8  5.0 - 10.5 fL Final    Neutrophils % 05/28/2024 83.6  % Final    Lymphocytes % 05/28/2024 9.7  
Department of Psychiatry  AttendingProgress Note  Chief Complaint: psychosis  Mahesh was cooperative. Not bathing. She stated that she continues to hear voices. Had visits from brothers this weekend. She would like to return to [place] but not clear if able. She shows minimal insight .   Increase Prolixin 5 mg AM and 10 mg HS   Decrease Abilify 5 mg QD  Patient's chart was reviewed and collaborated with  about the treatment plan.  SUBJECTIVE:    Patient is feeling better. Suicidal ideation:  denies suicidal ideation.  Patient does not have medication side effects.    ROS: Patient has new complaints: no  Sleeping adequately:  Yes   Appetite adequate: Yes  Attending groups: no  Visitors:Yes    OBJECTIVE    Physical  VITALS:  /66   Pulse 75   Temp 98 °F (36.7 °C) (Oral)   Resp 16   Ht 1.575 m (5' 2.01\")   Wt 60 kg (132 lb 4.4 oz)   SpO2 96%   BMI 24.19 kg/m²     Mental Status Examination:  Patients appearance was ill-appearing. Thoughts are Paucity of Ideas. Homicidal ideations none.  No abnormal movements, tics or mannerisms.  Memory intact Aims 0. Concentration Poor.   Alert and oriented X 4. Insight and Judgement impaired insight. Patient was cooperative. Patient gait normal. Mood constricted, affect flat affect Hallucinations present, suicidal ideations no specific plan to harm self Speech soft  Data  Labs:   Admission on 05/28/2024   Component Date Value Ref Range Status    WBC 05/28/2024 8.2  4.0 - 11.0 K/uL Final    RBC 05/28/2024 5.14  4.00 - 5.20 M/uL Final    Hemoglobin 05/28/2024 14.1  12.0 - 16.0 g/dL Final    Hematocrit 05/28/2024 42.8  36.0 - 48.0 % Final    MCV 05/28/2024 83.3  80.0 - 100.0 fL Final    MCH 05/28/2024 27.5  26.0 - 34.0 pg Final    MCHC 05/28/2024 33.0  31.0 - 36.0 g/dL Final    RDW 05/28/2024 15.0  12.4 - 15.4 % Final    Platelets 05/28/2024 275  135 - 450 K/uL Final    MPV 05/28/2024 8.8  5.0 - 10.5 fL Final    Neutrophils % 05/28/2024 83.6  % Final    Lymphocytes 
Department of Psychiatry  AttendingProgress Note  Chief Complaint: psychosis  Mahesh was in room. She appeared well groomed. She is working on a large science text. Mahesh is denying any SI. She stated that she was planning to go to a [facility] [place] .    Consider another antipsychotic but will continue using Abilify and Prolixin  Patient's chart was reviewed and collaborated with  about the treatment plan.  SUBJECTIVE:    Patient is feeling better. Suicidal ideation:  denies suicidal ideation.  Patient does not have medication side effects.    ROS: Patient has new complaints: no  Sleeping adequately:  Yes   Appetite adequate: Yes  Attending groups: Yes  Visitors:No    OBJECTIVE    Physical  VITALS:  /76   Pulse 71   Temp 98.2 °F (36.8 °C) (Oral)   Resp 16   Ht 1.575 m (5' 2.01\")   Wt 60 kg (132 lb 4.4 oz)   SpO2 97%   BMI 24.19 kg/m²     Mental Status Examination:  Patients appearance was ill-appearing. Thoughts are Paucity of Ideas. Homicidal ideations none.  No abnormal movements, tics or mannerisms.  Memory intact Aims 0. Concentration Fair.   Alert and oriented X 4. Insight and Judgement paranoid ideations. Patient was cooperative. Patient gait normal. Mood depressed, affect elevated affect Hallucinations Absent, suicidal ideations no specific plan to harm self Speech normal volume  Data  Labs:   Admission on 05/28/2024   Component Date Value Ref Range Status    WBC 05/28/2024 8.2  4.0 - 11.0 K/uL Final    RBC 05/28/2024 5.14  4.00 - 5.20 M/uL Final    Hemoglobin 05/28/2024 14.1  12.0 - 16.0 g/dL Final    Hematocrit 05/28/2024 42.8  36.0 - 48.0 % Final    MCV 05/28/2024 83.3  80.0 - 100.0 fL Final    MCH 05/28/2024 27.5  26.0 - 34.0 pg Final    MCHC 05/28/2024 33.0  31.0 - 36.0 g/dL Final    RDW 05/28/2024 15.0  12.4 - 15.4 % Final    Platelets 05/28/2024 275  135 - 450 K/uL Final    MPV 05/28/2024 8.8  5.0 - 10.5 fL Final    Neutrophils % 05/28/2024 83.6  % Final    Lymphocytes % 05/28/2024 
Department of Psychiatry  AttendingProgress Note  Chief Complaint: psychosis  Mahesh was seen outside of her room today . She was pleasant and engaged well. She stated that she wants to return to [place] after dc and she stated that her brothers are planning this. She is denying any self harm and voices are still positive.     Tolerating Abilify and Prolixin.   Patient's chart was reviewed and collaborated with  about the treatment plan.  SUBJECTIVE:    Patient is feeling better. Suicidal ideation:  denies suicidal ideation.  Patient does not have medication side effects.    ROS: Patient has new complaints: no  Sleeping adequately:  Yes   Appetite adequate: Yes  Attending groups: No:   Visitors:Yes    OBJECTIVE    Physical  VITALS:  /80   Pulse 73   Temp 97.3 °F (36.3 °C) (Oral)   Resp 16   Ht 1.575 m (5' 2.01\")   Wt 60 kg (132 lb 4.4 oz)   SpO2 97%   BMI 24.19 kg/m²     Mental Status Examination:  Patients appearance was ill-appearing. Thoughts are Paucity of Ideas. Homicidal ideations none.  No abnormal movements, tics or mannerisms.  Memory intact Aims 0. Concentration Poor.   Alert and oriented X 4. Insight and Judgement impaired insight. Patient was cooperative. Patient gait normal. Mood constricted, affect flat affect Hallucinations present, suicidal ideations no specific plan to harm self Speech soft  Data  Labs:   Admission on 05/28/2024   Component Date Value Ref Range Status    WBC 05/28/2024 8.2  4.0 - 11.0 K/uL Final    RBC 05/28/2024 5.14  4.00 - 5.20 M/uL Final    Hemoglobin 05/28/2024 14.1  12.0 - 16.0 g/dL Final    Hematocrit 05/28/2024 42.8  36.0 - 48.0 % Final    MCV 05/28/2024 83.3  80.0 - 100.0 fL Final    MCH 05/28/2024 27.5  26.0 - 34.0 pg Final    MCHC 05/28/2024 33.0  31.0 - 36.0 g/dL Final    RDW 05/28/2024 15.0  12.4 - 15.4 % Final    Platelets 05/28/2024 275  135 - 450 K/uL Final    MPV 05/28/2024 8.8  5.0 - 10.5 fL Final    Neutrophils % 05/28/2024 83.6  % Final    
Patient has been withdrawn to room thus far this shift. She is with flat affect. She reports her mood being sad. She reports she misses her country. Patient reports increased anxiety tonight. No suicidal ideations. She continues to report auditory hallucinations to which she does not elaborate upon. No HI/VH. Patient is compliant with HS medications and received PRN Trazodone for sleep. No distress noted.   
Patient has been withdrawn to room, resting in bed with eyes open. Upon assessment, she reports she is doing \"fine\". She reports auditory hallucinations of voices saying \"you won't be , you're going to hell, men won't care about you. She talks a lot about her mother making her cry. Patient denies any HI/VH. She is compliant with HS medications and received PRN Trazodone for sleep. No distress noted. Continuing to monitor.   
Patient may wear Hajab/prayer cloth while at facility per Dr. Ledesma.   
Pt asked to speak with this writer. When sitting down with pt she asked me about the shelters. Advised pt that it depends on where you go. If you go to a shelter for domestic violence you may not be able to tell your family where you are at nor possibly speak to them due to the safety of the other women and children that are there. Pt stated that her to be ren is coming to [facility] from [place] on 6/4 but that he family is postponing it due to that she is here. And her brothers told her that she would be going back to her country [place]. This writer than asked her why would they send you back if he is coming to [place] to take you to [place] with him? She than stated she does not know she is scared to back to [place] because her brothers and parents are here. I asked her how did she meet her ren and she told me that he is part of her mothers group and her mother approved of her speaking to him but they have not approved them getting . The pt asked would the shelter help her, I then told her they have resources to help women get back on their feet  and get independence that they want and need like a job, help with food, and an apartment. Advised that she would need to speak to the doctor or  to help with getting her in connection with a shelter. She then said that she got disability in [place] due to her stomach issue. I than asked what is the issue with her stomach as in the DX and she stated she didn't know. There is nothing in her chart about any surgeries or issues or results about her stomach. Asked pt if she was in pain now she said no but the pain comes and goes and sometimes she is unable to sleep from it. I asked her to describe it to me which she was having a little trouble describing it she said it was lower in her abdomin. Pt said it started when she was prescribed Stacey birth control but she was unable to get it due to that she was not . She stated that in [place] 
Pt has been withdrawn to room and bed this shift noted to be reading mostly. A&O  X4 She is calm pleasant and cooperative and brightens with interaction.  Pt denies current SI/HI/VH and agrees to communicate with staff if no longer feel safe or in control.  Pt does report some +AH but states they have improved. When ask if they are negative or command patient is unable to tell me but denies wanting to harm herself. She has been noted to eat meals and had a good visit with brother and mother. Denies anxiety and states her depression has improved. She is still interested in living independently.     
Pt has been withdrawn to room this shift. She is noted to be reading her quran and praying. She did eat breakfast. Reports good appetite and good sleep. Denies anxiety or depression. She is A&O X4 calm and cooperative.  Pt denies current SI/HI/VH and agrees to communicate with staff if no longer feel safe or in control. She reports +AH that are more positive and decreased in frequency. She is bright but constricted with conversation. She plans to go home with brother and family and wants to return to [place] eventually. Pt denies wanting to go to homeless shelter at this time.     
Pt received trazodone prn to help sleep   
Pt seclusive to room, only out to grab meal trays.  Pt ate breakfast and lunch.  She declined dinner.  Pt medication compliant.  Refused groups, despite much encouragement.  Pt brightens with interaction.  States that she feels better and denies all.  Monitored for safety and comfort.   
Pt up ad bryanna, pleasant and cooperative.  Pt brightens with interaction.  She is eating and drinking, minimally socializing, and medication compliant.  Mostly seclusive to room this shift.  Pt states she is starting to feel much better, hallucinations are less severe and mood is better.      Pt monitored for safety and comfort throughout the shift.   
Pt w/d to room this am noted to be reading. She did come out with encouragement for scheduled am medications and took her breakfast back to room. She is A&O x4. Flat but brightens with interactions. Pt denies current SI and agrees to communicate with staff if no longer feel safe or in control. When ask about AH pt states the are \"better.\" She is constricted with 1:1 but pleasant and cooperative.     
Pt withdrawn to self this shift, remained in her room.  Attended group X1.  Pt denies all and brightens with interaction.  Pt declined to visit with her brother today, he appeared to take the news well.  Pt declined to attend more groups, despite much encouragement. Medication compliant and ate well this shift.  Monitored for safety and comfort.   
      ( ) Basic information about quitting (benefits of quitting, techniques in how to quit, available resources  ( ) [facility]                                                                                                                   (x ) Patient refused counseling  ( x) Patient refused referral  ( x) Patient refused prescription upon discharge  ( x) Patient has not smoked in the last 30 days    Metabolic Screening:    Lab Results   Component Value Date    LABA1C 5.0 05/11/2024       Lab Results   Component Value Date    CHOL 156 05/11/2024     Lab Results   Component Value Date    TRIG 71 05/11/2024     Lab Results   Component Value Date    HDL 64 (H) 05/11/2024     No components found for: \"LDLCAL\"  No components found for: \"LABVLDL\"    Kenyon Mejia RN     
05/28/2024 Not Detected  Not Detected Final    Comment: Rapid NAAT:   Negative results should be treated as presumptive and,  if inconsistent with clinical signs and symptoms or necessary for  patient management, should be tested with an alternative molecular  assay. Negative results do not preclude SARS-CoV-2 infection and  should not be used as the sole basis for patient management decisions.  This test has been authorized by the FDA under an Emergency Use  Authorization (EUA) for use by authorized laboratories.    Fact sheet for Healthcare Providers:  https://www.fda.gov/media/601596/download  Fact sheet for Patients: https://www.fda.gov/media/249894/download    METHODOLOGY: Isothermal Nucleic Acid Amplification      Pregnancy, Urine 05/28/2024 Negative  Detects HCG level >20 MIU/mL Final    Comment: Note:  Always repeat results in question with a serum  quantitative pregnancy test. A serum hCG is positive  2-5 days before the urine hCG test.              Medications  Current Facility-Administered Medications: fluPHENAZine HCl (PROLIXIN) tablet 5 mg, 5 mg, Oral, Daily  fluPHENAZine HCl (PROLIXIN) tablet 10 mg, 10 mg, Oral, Nightly  magnesium hydroxide (MILK OF MAGNESIA) 400 MG/5ML suspension 30 mL, 30 mL, Oral, Daily PRN  aluminum & magnesium hydroxide-simethicone (MAALOX) 200-200-20 MG/5ML suspension 30 mL, 30 mL, Oral, Q6H PRN  OLANZapine (ZYPREXA) tablet 10 mg, 10 mg, Oral, Q8H PRN **OR** OLANZapine (ZyPREXA) injection 10 mg, 10 mg, IntraMUSCular, Q8H PRN  acetaminophen (TYLENOL) tablet 650 mg, 650 mg, Oral, Q8H PRN  hydrOXYzine HCl (ATARAX) tablet 50 mg, 50 mg, Oral, TID PRN  traZODone (DESYREL) tablet 50 mg, 50 mg, Oral, Nightly PRN  nicotine polacrilex (COMMIT) lozenge 2 mg, 2 mg, Oral, Q1H PRN    ASSESSMENT AND PLAN    Principal Problem:    Undifferentiated schizophrenia (HCC)  Resolved Problems:    * No resolved hospital problems. *       1.Patient s symptoms   are improving  2.Probable discharge is 1-2 
symptoms or necessary for  patient management, should be tested with an alternative molecular  assay. Negative results do not preclude SARS-CoV-2 infection and  should not be used as the sole basis for patient management decisions.  This test has been authorized by the FDA under an Emergency Use  Authorization (EUA) for use by authorized laboratories.    Fact sheet for Healthcare Providers:  https://www.fda.gov/media/576377/download  Fact sheet for Patients: https://www.fda.gov/media/944332/download    METHODOLOGY: Isothermal Nucleic Acid Amplification      Pregnancy, Urine 05/28/2024 Negative  Detects HCG level >20 MIU/mL Final    Comment: Note:  Always repeat results in question with a serum  quantitative pregnancy test. A serum hCG is positive  2-5 days before the urine hCG test.              Medications  Current Facility-Administered Medications: fluPHENAZine HCl (PROLIXIN) tablet 10 mg, 10 mg, Oral, Nightly  magnesium hydroxide (MILK OF MAGNESIA) 400 MG/5ML suspension 30 mL, 30 mL, Oral, Daily PRN  aluminum & magnesium hydroxide-simethicone (MAALOX) 200-200-20 MG/5ML suspension 30 mL, 30 mL, Oral, Q6H PRN  OLANZapine (ZYPREXA) tablet 10 mg, 10 mg, Oral, Q8H PRN **OR** OLANZapine (ZyPREXA) injection 10 mg, 10 mg, IntraMUSCular, Q8H PRN  ARIPiprazole (ABILIFY) tablet 10 mg, 10 mg, Oral, Daily  acetaminophen (TYLENOL) tablet 650 mg, 650 mg, Oral, Q8H PRN  hydrOXYzine HCl (ATARAX) tablet 50 mg, 50 mg, Oral, TID PRN  traZODone (DESYREL) tablet 50 mg, 50 mg, Oral, Nightly PRN  nicotine polacrilex (COMMIT) lozenge 2 mg, 2 mg, Oral, Q1H PRN    ASSESSMENT AND PLAN    Principal Problem:    Undifferentiated schizophrenia (HCC)  Resolved Problems:    * No resolved hospital problems. *       1.Patient's symptoms   are improving  2.Probable discharge is next week  3.Discharge planning is incomplete  4. Suicidal ideation is  none  5. Total time with patient was 40 minutes and more than 50 % of that time was spent counseling

## 2024-06-06 NOTE — PLAN OF CARE
Problem: Anxiety  Goal: Will report anxiety at manageable levels  Description: INTERVENTIONS:  1. Administer medication as ordered  2. Teach and rehearse alternative coping skills  3. Provide emotional support with 1:1 interaction with staff  6/1/2024 1137 by Kenyon Mejia RN  Outcome: Progressing  5/31/2024 2202 by Romina Us RN  Outcome: Progressing     Problem: Coping  Goal: Pt/Family able to verbalize concerns and demonstrate effective coping strategies  Description: INTERVENTIONS:  1. Assist patient/family to identify coping skills, available support systems and cultural and spiritual values  2. Provide emotional support, including active listening and acknowledgement of concerns of patient and caregivers  3. Reduce environmental stimuli, as able  4. Instruct patient/family in relaxation techniques, as appropriate  5. Assess for spiritual pain/suffering and initiate Spiritual Care, Psychosocial Clinical Specialist consults as needed  6/1/2024 1137 by Kenyon Mejia RN  Outcome: Progressing  5/31/2024 2202 by Romina Us RN  Outcome: Progressing     Problem: Depression/Self Harm  Goal: Effect of psychiatric condition will be minimized and patient will be protected from self harm  Description: INTERVENTIONS:  1. Assess impact of patient's symptoms on level of functioning, self care needs and offer support as indicated  2. Assess patient/family knowledge of depression, impact on illness and need for teaching  3. Provide emotional support, presence and reassurance  4. Assess for possible suicidal thoughts or ideation. If patient expresses suicidal thoughts or statements do not leave alone, initiate Suicide Precautions, move to a room close to the nursing station and obtain sitter  5. Initiate consults as appropriate with Mental Health Professional, Spiritual Care, Psychosocial CNS, and consider a recommendation to the [facility] for a Psychiatric Consultation  6/1/2024 1137 by Kenyon Mejia, 
  Problem: Anxiety  Goal: Will report anxiety at manageable levels  Description: INTERVENTIONS:  1. Administer medication as ordered  2. Teach and rehearse alternative coping skills  3. Provide emotional support with 1:1 interaction with staff  6/1/2024 1753 by Kenyon Mejia RN  Outcome: Progressing  6/1/2024 1137 by Kenyon Mejia RN  Outcome: Progressing     Problem: Coping  Goal: Pt/Family able to verbalize concerns and demonstrate effective coping strategies  Description: INTERVENTIONS:  1. Assist patient/family to identify coping skills, available support systems and cultural and spiritual values  2. Provide emotional support, including active listening and acknowledgement of concerns of patient and caregivers  3. Reduce environmental stimuli, as able  4. Instruct patient/family in relaxation techniques, as appropriate  5. Assess for spiritual pain/suffering and initiate Spiritual Care, Psychosocial Clinical Specialist consults as needed  6/1/2024 1753 by Kenyon Mejia RN  Outcome: Progressing  6/1/2024 1137 by Kenyon Mejia RN  Outcome: Progressing     Problem: Depression/Self Harm  Goal: Effect of psychiatric condition will be minimized and patient will be protected from self harm  Description: INTERVENTIONS:  1. Assess impact of patient's symptoms on level of functioning, self care needs and offer support as indicated  2. Assess patient/family knowledge of depression, impact on illness and need for teaching  3. Provide emotional support, presence and reassurance  4. Assess for possible suicidal thoughts or ideation. If patient expresses suicidal thoughts or statements do not leave alone, initiate Suicide Precautions, move to a room close to the nursing station and obtain sitter  5. Initiate consults as appropriate with Mental Health Professional, Spiritual Care, Psychosocial CNS, and consider a recommendation to the [facility] for a Psychiatric Consultation  6/1/2024 1753 by Kenyon Mejia, SVETA  Outcome: 
  Problem: Anxiety  Goal: Will report anxiety at manageable levels  Description: INTERVENTIONS:  1. Administer medication as ordered  2. Teach and rehearse alternative coping skills  3. Provide emotional support with 1:1 interaction with staff  6/1/2024 2131 by Maria R Mccormack RN  Outcome: Progressing     Problem: Depression/Self Harm  Goal: Effect of psychiatric condition will be minimized and patient will be protected from self harm  Description: INTERVENTIONS:  1. Assess impact of patient's symptoms on level of functioning, self care needs and offer support as indicated  2. Assess patient/family knowledge of depression, impact on illness and need for teaching  3. Provide emotional support, presence and reassurance  4. Assess for possible suicidal thoughts or ideation. If patient expresses suicidal thoughts or statements do not leave alone, initiate Suicide Precautions, move to a room close to the nursing station and obtain sitter  5. Initiate consults as appropriate with Mental Health Professional, Spiritual Care, Psychosocial CNS, and consider a recommendation to the [facility] for a Psychiatric Consultation  6/1/2024 2131 by Maria R Mccormack, RN  Outcome: Progressing     
  Problem: Anxiety  Goal: Will report anxiety at manageable levels  Description: INTERVENTIONS:  1. Administer medication as ordered  2. Teach and rehearse alternative coping skills  3. Provide emotional support with 1:1 interaction with staff  6/2/2024 0843 by Kenyon Mejia RN  Outcome: Progressing  6/1/2024 2131 by Maria R Mccormack RN  Outcome: Progressing     Problem: Coping  Goal: Pt/Family able to verbalize concerns and demonstrate effective coping strategies  Description: INTERVENTIONS:  1. Assist patient/family to identify coping skills, available support systems and cultural and spiritual values  2. Provide emotional support, including active listening and acknowledgement of concerns of patient and caregivers  3. Reduce environmental stimuli, as able  4. Instruct patient/family in relaxation techniques, as appropriate  5. Assess for spiritual pain/suffering and initiate Spiritual Care, Psychosocial Clinical Specialist consults as needed  Outcome: Progressing     Problem: Depression/Self Harm  Goal: Effect of psychiatric condition will be minimized and patient will be protected from self harm  Description: INTERVENTIONS:  1. Assess impact of patient's symptoms on level of functioning, self care needs and offer support as indicated  2. Assess patient/family knowledge of depression, impact on illness and need for teaching  3. Provide emotional support, presence and reassurance  4. Assess for possible suicidal thoughts or ideation. If patient expresses suicidal thoughts or statements do not leave alone, initiate Suicide Precautions, move to a room close to the nursing station and obtain sitter  5. Initiate consults as appropriate with Mental Health Professional, Spiritual Care, Psychosocial CNS, and consider a recommendation to the [facility] for a Psychiatric Consultation  6/2/2024 0843 by Kenyon Mejia RN  Outcome: Progressing  6/1/2024 2131 by Maria R Mccormack RN  Outcome: Progressing     
  Problem: Anxiety  Goal: Will report anxiety at manageable levels  Description: INTERVENTIONS:  1. Administer medication as ordered  2. Teach and rehearse alternative coping skills  3. Provide emotional support with 1:1 interaction with staff  6/2/2024 2116 by Maria R Mccormack RN  Outcome: Progressing     Problem: Coping  Goal: Pt/Family able to verbalize concerns and demonstrate effective coping strategies  Description: INTERVENTIONS:  1. Assist patient/family to identify coping skills, available support systems and cultural and spiritual values  2. Provide emotional support, including active listening and acknowledgement of concerns of patient and caregivers  3. Reduce environmental stimuli, as able  4. Instruct patient/family in relaxation techniques, as appropriate  5. Assess for spiritual pain/suffering and initiate Spiritual Care, Psychosocial Clinical Specialist consults as needed  6/2/2024 2116 by Maria R Mccormack RN  Outcome: Progressing     Problem: Depression/Self Harm  Goal: Effect of psychiatric condition will be minimized and patient will be protected from self harm  Description: INTERVENTIONS:  1. Assess impact of patient's symptoms on level of functioning, self care needs and offer support as indicated  2. Assess patient/family knowledge of depression, impact on illness and need for teaching  3. Provide emotional support, presence and reassurance  4. Assess for possible suicidal thoughts or ideation. If patient expresses suicidal thoughts or statements do not leave alone, initiate Suicide Precautions, move to a room close to the nursing station and obtain sitter  5. Initiate consults as appropriate with Mental Health Professional, Spiritual Care, Psychosocial CNS, and consider a recommendation to the [facility] for a Psychiatric Consultation  6/2/2024 2116 by Maria R Mccormack, RN  Outcome: Progressing     
  Problem: Anxiety  Goal: Will report anxiety at manageable levels  Description: INTERVENTIONS:  1. Administer medication as ordered  2. Teach and rehearse alternative coping skills  3. Provide emotional support with 1:1 interaction with staff  6/4/2024 1005 by Zachery Cee (Peters) RN  Outcome: Progressing    Problem: Coping  Goal: Pt/Family able to verbalize concerns and demonstrate effective coping strategies  Description: INTERVENTIONS:  1. Assist patient/family to identify coping skills, available support systems and cultural and spiritual values  2. Provide emotional support, including active listening and acknowledgement of concerns of patient and caregivers  3. Reduce environmental stimuli, as able  4. Instruct patient/family in relaxation techniques, as appropriate  5. Assess for spiritual pain/suffering and initiate Spiritual Care, Psychosocial Clinical Specialist consults as needed  6/4/2024 1005 by Zachery Cee (Peters), RN  Outcome: Progressing       
  Problem: Anxiety  Goal: Will report anxiety at manageable levels  Description: INTERVENTIONS:  1. Administer medication as ordered  2. Teach and rehearse alternative coping skills  3. Provide emotional support with 1:1 interaction with staff  6/6/2024 0958 by Kenyon Mejia RN  Outcome: Progressing  6/5/2024 2131 by Romina Us RN  Outcome: Progressing     Problem: Coping  Goal: Pt/Family able to verbalize concerns and demonstrate effective coping strategies  Description: INTERVENTIONS:  1. Assist patient/family to identify coping skills, available support systems and cultural and spiritual values  2. Provide emotional support, including active listening and acknowledgement of concerns of patient and caregivers  3. Reduce environmental stimuli, as able  4. Instruct patient/family in relaxation techniques, as appropriate  5. Assess for spiritual pain/suffering and initiate Spiritual Care, Psychosocial Clinical Specialist consults as needed  6/6/2024 0958 by Kenyon Mejia RN  Outcome: Progressing  6/5/2024 2131 by Romina Us RN  Outcome: Progressing     Problem: Depression/Self Harm  Goal: Effect of psychiatric condition will be minimized and patient will be protected from self harm  Description: INTERVENTIONS:  1. Assess impact of patient's symptoms on level of functioning, self care needs and offer support as indicated  2. Assess patient/family knowledge of depression, impact on illness and need for teaching  3. Provide emotional support, presence and reassurance  4. Assess for possible suicidal thoughts or ideation. If patient expresses suicidal thoughts or statements do not leave alone, initiate Suicide Precautions, move to a room close to the nursing station and obtain sitter  5. Initiate consults as appropriate with Mental Health Professional, Spiritual Care, Psychosocial CNS, and consider a recommendation to the [facility] for a Psychiatric Consultation  6/6/2024 0958 by Kenyon Mejia, 
  Problem: Anxiety  Goal: Will report anxiety at manageable levels  Description: INTERVENTIONS:  1. Administer medication as ordered  2. Teach and rehearse alternative coping skills  3. Provide emotional support with 1:1 interaction with staff  Outcome: Progressing      05/30/24 0922   Mental Status and Behavioral Exam   Normal No   Level of Assistance Independent/Self   Facial Expression Brightened   Affect Congruent   Level of Consciousness Alert   Frequency of Checks 4 times per hour, close   Mood:Normal No   Mood Anxious   Motor Activity:Normal No   Motor Activity Decreased   Eye Contact Good   Observed Behavior Cooperative   Sexual Misconduct History Current - no   [place] to person;[place] to time;[place] to place   Attention:Normal No   Attention Unable to concentrate   Thought Processes Circumstantial   Thought Content:Normal No   Thought Content Poverty of content   Depression Symptoms Impaired concentration   Anxiety Symptoms Generalized   Bella Symptoms No problems reported or observed.   Hallucinations None   Delusions No   Memory:Normal Yes   Insight and Judgment No   Insight and Judgment Poor insight;Poor judgment     
  Problem: Anxiety  Goal: Will report anxiety at manageable levels  Description: INTERVENTIONS:  1. Administer medication as ordered  2. Teach and rehearse alternative coping skills  3. Provide emotional support with 1:1 interaction with staff  Outcome: Progressing  Flowsheets (Taken 5/29/2024 1426)  Will report anxiety at manageable levels:   Administer medication as ordered   Teach and rehearse alternative coping skills     Problem: Coping  Goal: Pt/Family able to verbalize concerns and demonstrate effective coping strategies  Description: INTERVENTIONS:  1. Assist patient/family to identify coping skills, available support systems and cultural and spiritual values  2. Provide emotional support, including active listening and acknowledgement of concerns of patient and caregivers  3. Reduce environmental stimuli, as able  4. Instruct patient/family in relaxation techniques, as appropriate  5. Assess for spiritual pain/suffering and initiate Spiritual Care, Psychosocial Clinical Specialist consults as needed  Outcome: Progressing  Flowsheets (Taken 5/29/2024 1426)  Patient/family able to verbalize anxieties, fears, and concerns, and demonstrate effective coping: Assist patient/family to identify coping skills, available support systems and cultural and spiritual values     Problem: Depression/Self Harm  Goal: Effect of psychiatric condition will be minimized and patient will be protected from self harm  Description: INTERVENTIONS:  1. Assess impact of patient's symptoms on level of functioning, self care needs and offer support as indicated  2. Assess patient/family knowledge of depression, impact on illness and need for teaching  3. Provide emotional support, presence and reassurance  4. Assess for possible suicidal thoughts or ideation. If patient expresses suicidal thoughts or statements do not leave alone, initiate Suicide Precautions, move to a room close to the nursing station and obtain sitter  5. Initiate 
  Problem: Coping  Goal: Pt/Family able to verbalize concerns and demonstrate effective coping strategies  Description: INTERVENTIONS:  1. Assist patient/family to identify coping skills, available support systems and cultural and spiritual values  2. Provide emotional support, including active listening and acknowledgement of concerns of patient and caregivers  3. Reduce environmental stimuli, as able  4. Instruct patient/family in relaxation techniques, as appropriate  5. Assess for spiritual pain/suffering and initiate Spiritual Care, Psychosocial Clinical Specialist consults as needed  6/5/2024 1246 by Zachery Cee RN (Peters)  Outcome: Progressing  Flowsheets (Taken 6/4/2024 2346 by Keri New LPN)  Patient/family able to verbalize anxieties, fears, and concerns, and demonstrate effective coping: Provide emotional support, including active listening and acknowledgement of concerns of patient and caregivers     Problem: Anxiety  Goal: Will report anxiety at manageable levels  Description: INTERVENTIONS:  1. Administer medication as ordered  2. Teach and rehearse alternative coping skills  3. Provide emotional support with 1:1 interaction with staff  6/5/2024 1246 by Zachery Cee RN (Peters)  Outcome: Progressing  Flowsheets (Taken 6/4/2024 2346 by Keri New LPN)  Will report anxiety at manageable levels: Provide emotional support with 1:1 interaction with staff     
  Problem: Pain  Goal: Verbalizes/displays adequate comfort level or baseline comfort level  5/31/2024 1914 by Zachery Cee RN (Peters)  Outcome: Progressing     Problem: Anxiety  Goal: Will report anxiety at manageable levels  Description: INTERVENTIONS:  1. Administer medication as ordered  2. Teach and rehearse alternative coping skills  3. Provide emotional support with 1:1 interaction with staff  5/31/2024 1914 by Zachery Cee (Peters) RN  Outcome: Progressing  5/31/2024 1913 by Zachery Cee (Peters) RN  Outcome: Progressing     Problem: Coping  Goal: Pt/Family able to verbalize concerns and demonstrate effective coping strategies  Description: INTERVENTIONS:  1. Assist patient/family to identify coping skills, available support systems and cultural and spiritual values  2. Provide emotional support, including active listening and acknowledgement of concerns of patient and caregivers  3. Reduce environmental stimuli, as able  4. Instruct patient/family in relaxation techniques, as appropriate  5. Assess for spiritual pain/suffering and initiate Spiritual Care, Psychosocial Clinical Specialist consults as needed  5/31/2024 1914 by Zachery Cee (Peters) RN  Outcome: Progressing  5/31/2024 1913 by Zachery Cee RN (Peters)  Outcome: Progressing     
  Problem: Pain  Goal: Verbalizes/displays adequate comfort level or baseline comfort level  Outcome: Progressing     Problem: Anxiety  Goal: Will report anxiety at manageable levels  Description: INTERVENTIONS:  1. Administer medication as ordered  2. Teach and rehearse alternative coping skills  3. Provide emotional support with 1:1 interaction with staff  Outcome: Progressing     Problem: Coping  Goal: Pt/Family able to verbalize concerns and demonstrate effective coping strategies  Description: INTERVENTIONS:  1. Assist patient/family to identify coping skills, available support systems and cultural and spiritual values  2. Provide emotional support, including active listening and acknowledgement of concerns of patient and caregivers  3. Reduce environmental stimuli, as able  4. Instruct patient/family in relaxation techniques, as appropriate  5. Assess for spiritual pain/suffering and initiate Spiritual Care, Psychosocial Clinical Specialist consults as needed  Outcome: Progressing     
  Problem: Pain  Goal: Verbalizes/displays adequate comfort level or baseline comfort level  Outcome: Progressing     Problem: Anxiety  Goal: Will report anxiety at manageable levels  Description: INTERVENTIONS:  1. Administer medication as ordered  2. Teach and rehearse alternative coping skills  3. Provide emotional support with 1:1 interaction with staff  Outcome: Progressing     Problem: Coping  Goal: Pt/Family able to verbalize concerns and demonstrate effective coping strategies  Description: INTERVENTIONS:  1. Assist patient/family to identify coping skills, available support systems and cultural and spiritual values  2. Provide emotional support, including active listening and acknowledgement of concerns of patient and caregivers  3. Reduce environmental stimuli, as able  4. Instruct patient/family in relaxation techniques, as appropriate  5. Assess for spiritual pain/suffering and initiate Spiritual Care, Psychosocial Clinical Specialist consults as needed  Outcome: Progressing     Problem: Depression/Self Harm  Goal: Effect of psychiatric condition will be minimized and patient will be protected from self harm  Description: INTERVENTIONS:  1. Assess impact of patient's symptoms on level of functioning, self care needs and offer support as indicated  2. Assess patient/family knowledge of depression, impact on illness and need for teaching  3. Provide emotional support, presence and reassurance  4. Assess for possible suicidal thoughts or ideation. If patient expresses suicidal thoughts or statements do not leave alone, initiate Suicide Precautions, move to a room close to the nursing station and obtain sitter  5. Initiate consults as appropriate with Mental Health Professional, Spiritual Care, Psychosocial CNS, and consider a recommendation to the LIP for a Psychiatric Consultation  Outcome: Progressing     
4 Eyes Skin Assessment     The patient is being assessed for  Admission    I agree that 2 RN's have performed a thorough Head to Toe Skin Assessment on the patient. ALL assessment sites listed below have been assessed.       Areas assessed for pressure by both nurses:   [x]   Head, Face, and Ears   [x]   Shoulders, Back, and Chest  [x]   Arms, Elbows, and Hands   [x]   Coccyx, Sacrum, and Ischum  [x]   Legs, Feet, and Heels                                Skin Assessed Under all Medical Devices by both nurses:  N/a               All Mepilex Borders were peeled back and area peeked at by both nurses:  No:    Please list where Mepilex Borders are located:  n/a                 Does the Patient have Skin Breakdown related to pressure?   n/a             Ricky Prevention initiated:  NA   Wound Care Orders initiated:  NA      [facility] nurse consulted for Pressure Injury (Stage 3,4, Unstageable, DTI, NWPT, Complex wounds)and New or Established Ostomies:  NA        Nurse 1 eSignature: Electronically signed by Tracey Walsh RN on 5/28/24 at 4:49 PM EDT    **SHARE this note so that the co-signing nurse is able to place an eSignature**    Nurse 2 eSignature: Electronically signed by Rossy Howell RN on 5/28/24 at 4:51 PM EDT   
[facility]  Initial Interdisciplinary Treatment Plan NOTE    Review Date & Time: 5/29/2024 0930    Patient was not in treatment team    Admission Type:   Admission Type: Voluntary    Reason for admission:  Reason for Admission: Psychosis, altered thought process, flight of ideas      Estimated Length of Stay Update:  5 days  Estimated Discharge Date Update: 6/3/2024    EDUCATION:   Learner Progress Toward Treatment Goals: Reviewed results and recommendations of this team    Method: Individual    Outcome: Verbalized understanding      PLAN/TREATMENT RECOMMENDATIONS UPDATE:Stabilize and treat    GOALS UPDATE:   Time frame for Short-Term Goals: 3 days    Yasemin Pro RN        
[facility]  Treatment Team Note  Review Date & Time: 06/05/24  1000    Patient was not in treatment team      Status EXAM:   Mental Status and Behavioral Exam  Normal: No  Level of Assistance: Independent/Self  Facial Expression: Brightened  Affect: Appropriate  Level of Consciousness: Alert  Frequency of Checks: 4 times per hour, close  Mood:Normal: No  Mood: Sad  Motor Activity:Normal: No  Motor Activity: Decreased  Eye Contact: Fair  Observed Behavior: Withdrawn, Cooperative, Friendly  Sexual Misconduct History: Current - no  Preception: [place] to person, [place] to time, [place] to place, [place] to situation  Attention:Normal: No  Attention: Distractible  Thought Processes: Unremarkable  Thought Content:Normal: Yes  Thought Content: Poverty of content  Depression Symptoms: Isolative  Anxiety Symptoms: Generalized  Bella Symptoms: No problems reported or observed.  Hallucinations: None  Delusions: No  Delusions: Other (comment) (denies this time)  Memory:Normal: No  Memory: Poor recent  Insight and Judgment: No  Insight and Judgment: Poor judgment      Suicide Risk CSSR-S:  1) Within the past month, have you wished you were dead or wished you could go to sleep and not wake up? : No  2) Have you actually had any thoughts of killing yourself? : No  3) Have you been thinking about how you might kill yourself? : No  5) Have you started to work out or worked out the details of how to kill yourself? Do you intend to carry out this plan? : No  6) Have you ever done anything, started to do anything, or prepared to do anything to end your life?: No      PLAN/TREATMENT RECOMMENDATIONS UPDATE:   Patient will take medication as prescribed, eat 75% of meals, attend groups, participate in milieu activities, participate in treatment team and care planning for discharge and follow up.           Tracey Walsh RN   
Isolative to room this shift. Calm/cooperative, Medication compliant. Audio Hallucinations wont give specifics on what she hears at this time. Denies VH/SI at this time.   Problem: Pain  Goal: Verbalizes/displays adequate comfort level or baseline comfort level  Outcome: Progressing     Problem: Anxiety  Goal: Will report anxiety at manageable levels  Description: INTERVENTIONS:  1. Administer medication as ordered  2. Teach and rehearse alternative coping skills  3. Provide emotional support with 1:1 interaction with staff  6/3/2024 0933 by Maria R Ley RN  Outcome: Progressing  Flowsheets (Taken 6/3/2024 0928)  Will report anxiety at manageable levels:   Teach and rehearse alternative coping skills   Administer medication as ordered  6/2/2024 2116 by Maria R Mccormack RN  Outcome: Progressing     Problem: Coping  Goal: Pt/Family able to verbalize concerns and demonstrate effective coping strategies  Description: INTERVENTIONS:  1. Assist patient/family to identify coping skills, available support systems and cultural and spiritual values  2. Provide emotional support, including active listening and acknowledgement of concerns of patient and caregivers  3. Reduce environmental stimuli, as able  4. Instruct patient/family in relaxation techniques, as appropriate  5. Assess for spiritual pain/suffering and initiate Spiritual Care, Psychosocial Clinical Specialist consults as needed  6/3/2024 0933 by Maria R Ley RN  Outcome: Progressing  Flowsheets (Taken 6/3/2024 0928)  Patient/family able to verbalize anxieties, fears, and concerns, and demonstrate effective coping: Assist patient/family to identify coping skills, available support systems and cultural and spiritual values  6/2/2024 2116 by Maria R Mccormack RN  Outcome: Progressing     Problem: Depression/Self Harm  Goal: Effect of psychiatric condition will be minimized and patient will be protected from self harm  Description: INTERVENTIONS:  1. 
Mahesh has been isolating in her room. She has been observed reading and praying. She is pleasant on approach but superficially bright with a big broad smile. Denies SI/HI/AVH but appears preoccupied. Denies \"voices\" but says sometimes has \"strange thoughts.\" Could/would not elaborate. Med compliant. Requested PRN Trazodone for sleep @ 2108. Will monitor sleep pattern overnight.     
Mahesh has been withdrawn to her room this shift. She is pleasant during each interaction with this writer, other staff, and her peers. Mahesh is eager to converse with this writer during the 1:1 shift assessment. She doesn't have any scheduled nightly medications and did not request any PRN medications. Mahesh came out of her room around 2330 and told this writer that she does not want to go back home, but needs help because her family confiscated her social security card and money. This writer encouraged Mahesh to speak with social work/a discharge planner in the morning to discuss her discharge options. Mahesh denies SI, HI, and AVH.     Problem: Pain  Goal: Verbalizes/displays adequate comfort level or baseline comfort level  5/28/2024 2200 by Brinda Carlos RN  Outcome: Progressing     Problem: Anxiety  Goal: Will report anxiety at manageable levels  Description: INTERVENTIONS:  1. Administer medication as ordered  2. Teach and rehearse alternative coping skills  3. Provide emotional support with 1:1 interaction with staff  5/28/2024 2200 by Brinda Carlos RN  Outcome: Progressing     Problem: Coping  Goal: Pt/Family able to verbalize concerns and demonstrate effective coping strategies  Description: INTERVENTIONS:  1. Assist patient/family to identify coping skills, available support systems and cultural and spiritual values  2. Provide emotional support, including active listening and acknowledgement of concerns of patient and caregivers  3. Reduce environmental stimuli, as able  4. Instruct patient/family in relaxation techniques, as appropriate  5. Assess for spiritual pain/suffering and initiate Spiritual Care, Psychosocial Clinical Specialist consults as needed  5/28/2024 2200 by Brinda Carlos RN  Outcome: Progressing     Problem: Depression/Self Harm  Goal: Effect of psychiatric condition will be minimized and patient will be protected from self harm  Description: INTERVENTIONS:  1. Assess impact of 
Patient arrived to unit via wheelchair accompanied by security and I staff.  She was shown to assigned room.  Vitals obtained.  Patient is calm and cooperative at this time.    
Problem: Pain  Goal: Verbalizes/displays adequate comfort level or baseline comfort level  Outcome: Progressing    Problem: Anxiety  Goal: Will report anxiety at manageable levels  Description: INTERVENTIONS:  1. Administer medication as ordered  2. Teach and rehearse alternative coping skills  3. Provide emotional support with 1:1 interaction with staff  Outcome: Progressing  Flowsheets (Taken 6/3/2024 0928)  Will report anxiety at manageable levels:   Teach and rehearse alternative coping skills   Administer medication as ordered    Problem: Depression/Self Harm  Goal: Effect of psychiatric condition will be minimized and patient will be protected from self harm  Description: INTERVENTIONS:  1. Assess impact of patient's symptoms on level of functioning, self care needs and offer support as indicated  2. Assess patient/family knowledge of depression, impact on illness and need for teaching  3. Provide emotional support, presence and reassurance  4. Assess for possible suicidal thoughts or ideation. If patient expresses suicidal thoughts or statements do not leave alone, initiate Suicide Precautions, move to a room close to the nursing station and obtain sitter  5. Initiate consults as appropriate with Mental Health Professional, Spiritual Care, Psychosocial CNS, and consider a recommendation to the LIP for a Psychiatric Consultation  Outcome: Progressing  Flowsheets (Taken 6/3/2024 2058)  Effect of psychiatric condition will be minimized and patient will be protected from self harm:   Assess impact of patient’s symptoms on level of functioning, self care needs and offer support as indicated   Assess for suicidal thoughts or ideation. If patient expresses suicidal thoughts or statements do not leave alone, initiate Suicide Precautions, move near nurse station, obtain sitter   Provide emotional support, presence and reassurance  
Pt has been isolative to room (praying and reading) but calm, cooperative, and friendly upon approach. Pt was pleasant to talk to and was compliant with scheduled medications. Pt states she is ready to go home tomorrow and is looking forward to it. Pt denies SI/HI/AVH.          Problem: Pain  Goal: Verbalizes/displays adequate comfort level or baseline comfort level  Outcome: Progressing     Problem: Anxiety  Goal: Will report anxiety at manageable levels  Description: INTERVENTIONS:  1. Administer medication as ordered  2. Teach and rehearse alternative coping skills  3. Provide emotional support with 1:1 interaction with staff  Outcome: Progressing     Problem: Coping  Goal: Pt/Family able to verbalize concerns and demonstrate effective coping strategies  Description: INTERVENTIONS:  1. Assist patient/family to identify coping skills, available support systems and cultural and spiritual values  2. Provide emotional support, including active listening and acknowledgement of concerns of patient and caregivers  3. Reduce environmental stimuli, as able  4. Instruct patient/family in relaxation techniques, as appropriate  5. Assess for spiritual pain/suffering and initiate Spiritual Care, Psychosocial Clinical Specialist consults as needed  Outcome: Progressing     Problem: Depression/Self Harm  Goal: Effect of psychiatric condition will be minimized and patient will be protected from self harm  Description: INTERVENTIONS:  1. Assess impact of patient's symptoms on level of functioning, self care needs and offer support as indicated  2. Assess patient/family knowledge of depression, impact on illness and need for teaching  3. Provide emotional support, presence and reassurance  4. Assess for possible suicidal thoughts or ideation. If patient expresses suicidal thoughts or statements do not leave alone, initiate Suicide Precautions, move to a room close to the nursing station and obtain sitter  5. Initiate consults as 
Pt has been isolative to room but calm, cooperative, and friendly upon approach. Pt appears brightened upon interaction with writer during assessment. Pt states she is feeling better. Pt denies SI/HI/VH but endorses AH. Pt states she hears voices but that the meds are working and that it is getting better. Pt states the voices are positive, non commanding, and that they come and go vs continuously hearing them. Pt was compliant with scheduled medication.     PRN trazodone given at 2044 with effectiveness.        Problem: Pain  Goal: Verbalizes/displays adequate comfort level or baseline comfort level  Outcome: Progressing     Problem: Anxiety  Goal: Will report anxiety at manageable levels  Description: INTERVENTIONS:  1. Administer medication as ordered  2. Teach and rehearse alternative coping skills  3. Provide emotional support with 1:1 interaction with staff  Outcome: Progressing     Problem: Coping  Goal: Pt/Family able to verbalize concerns and demonstrate effective coping strategies  Description: INTERVENTIONS:  1. Assist patient/family to identify coping skills, available support systems and cultural and spiritual values  2. Provide emotional support, including active listening and acknowledgement of concerns of patient and caregivers  3. Reduce environmental stimuli, as able  4. Instruct patient/family in relaxation techniques, as appropriate  5. Assess for spiritual pain/suffering and initiate Spiritual Care, Psychosocial Clinical Specialist consults as needed  Outcome: Progressing     Problem: Depression/Self Harm  Goal: Effect of psychiatric condition will be minimized and patient will be protected from self harm  Description: INTERVENTIONS:  1. Assess impact of patient's symptoms on level of functioning, self care needs and offer support as indicated  2. Assess patient/family knowledge of depression, impact on illness and need for teaching  3. Provide emotional support, presence and reassurance  4. 
Assess for possible suicidal thoughts or ideation. If patient expresses suicidal thoughts or statements do not leave alone, initiate Suicide Precautions, move to a room close to the nursing station and obtain sitter  5. Initiate consults as appropriate with Mental Health Professional, Spiritual Care, Psychosocial CNS, and consider a recommendation to the [facility] for a Psychiatric Consultation  Outcome: Progressing     
[facility]                                                                                                                   ( ) Patient refused counseling  ( x) Patient has not smoked in the last 30 days    Metabolic Screening:    Lab Results   Component Value Date    LABA1C 5.0 05/11/2024       Lab Results   Component Value Date    CHOL 156 05/11/2024     Lab Results   Component Value Date    TRIG 71 05/11/2024     Lab Results   Component Value Date    HDL 64 (H) 05/11/2024     No components found for: \"LDLCAL\"  No components found for: \"LABVLDL\"      Body mass index is 24.19 kg/m².    BP Readings from Last 2 Encounters:   05/28/24 111/73   05/21/24 103/67           Pt admitted with followings belongings:  Dental Appliances: None  Vision - Corrective Lenses: None  Hearing Aid: None  Jewelry: Necklace, Bracelet (in purse)  Body Piercings Removed: No  Clothing: Jacket/Coat, Dress, Other (Comment), Robe, Footwear  Other Valuables: Purse  The following personal items were collected during admission. Items secured in locker/safe. Items will be returned to patient at discharge.     Tracey Walsh RN        
patient expresses suicidal thoughts or statements do not leave alone, initiate Suicide Precautions, move to a room close to the nursing station and obtain sitter  5. Initiate consults as appropriate with Mental Health Professional, Spiritual Care, Psychosocial CNS, and consider a recommendation to the [facility]IP for a Psychiatric Consultation  Outcome: Progressing

## 2024-06-06 NOTE — TRANSITION OF CARE
Behavioral Health Transition Record    Patient Name: Mahesh Youngblood  YOB: 1985   Medical Record Number: [id number]  Date of Admission: 5/28/2024  4:10 AM   Date of Discharge: 6/6/2024    Attending Provider: Oj Ldeesma MD   Discharging Provider: Oj Ledesma MD   To contact this individual call [phone] and ask the  to page.  If unavailable, ask to be transferred to Behavioral Health Provider on call.  A Behavioral Health Provider will be available on call 24/7 and during holidays.    Primary Care Provider: No primary care provider on file.    No Known Allergies    Reason for Admission: Mahesh Youngblood is a 38 y.o. female with history of schizophrenia who was recently admitted to the hospital who is brought into the emergency department by family for evaluation.  Per the family, patient was found wandering around the street.  She left around 1 AM from the family's house with a suitcase.  They found her on the street wandering by herself. She said she wanted to go to a hotel to have sex with men.      Says last time she was admitted, she was talking about needing to go to [place].     Family tried for over two hours to try to get her to agree to go home and they drove around in the car.  However, patient insisted that she did not want to go home.  Family did not know what else to do so they brought her in for evaluation.     Admission Diagnosis: Mood disorder (HCC) [F39]  Depression, unspecified [F32.A]    * No surgery found *    Results for orders placed or performed during the hospital encounter of 05/28/24   COVID-19, Rapid    Specimen: Nasopharyngeal Swab   Result Value Ref Range    SARS-CoV-2, NAAT Not Detected Not Detected   CBC with Auto Differential   Result Value Ref Range    WBC 8.2 4.0 - 11.0 K/uL    RBC 5.14 4.00 - 5.20 M/uL    Hemoglobin 14.1 12.0 - 16.0 g/dL    Hematocrit 42.8 36.0 - 48.0 %    MCV 83.3 80.0 - 100.0 fL    MCH 27.5 26.0 - 34.0 pg    MCHC 33.0 31.0 - 36.0

## 2024-06-07 ENCOUNTER — FOLLOWUP TELEPHONE ENCOUNTER (OUTPATIENT)
Dept: PSYCHIATRY | Age: 39
End: 2024-06-07

## 2024-06-10 ENCOUNTER — FOLLOWUP TELEPHONE ENCOUNTER (OUTPATIENT)
Dept: PSYCHIATRY | Age: 39
End: 2024-06-10

## 2024-06-11 ENCOUNTER — FOLLOWUP TELEPHONE ENCOUNTER (OUTPATIENT)
Dept: PSYCHIATRY | Age: 39
End: 2024-06-11

## 2024-12-12 NOTE — GROUP NOTE
Group Therapy Note    Date: 5/18/2024    Group Start Time: 1000  Group End Time: 1050  Group Topic: Psychoeducation    [facility]    Brooke Wilcox LPC    Participants engaged in discussion on somatic symptoms. JOSÉ MIGUEL provided psychoeducation on somatic symptoms for anxiety and depression. Participants discussed importance of identifying somatic symptoms in order to mitigate major episodes/panic attacks. Participants engaged in art activity to process/organize current thoughts and emotions.     Group Therapy Note    Attendees: 10       Notes:  Mahesh was present during the group and stayed the duration. She did not share personal connections but did engage in the activity appropriately.     Status After Intervention:  Unchanged    Participation Level: Active Listener    Participation Quality: Appropriate      Speech:  normal      Thought Process/Content: Logical      Affective Functioning: Congruent      Mood: anxious      Level of consciousness:  Alert and Oriented x4      Response to Learning: Able to verbalize current knowledge/experience      Endings: None Reported    Modes of Intervention: Education, Support, and Exploration      Discipline Responsible: /Counselor      Signature:  Brooke Wilcox LPC     Ambulatory

## 2025-01-19 ENCOUNTER — HOSPITAL ENCOUNTER (EMERGENCY)
Age: 40
Discharge: HOME | End: 2025-01-20

## 2025-01-19 ENCOUNTER — APPOINTMENT (OUTPATIENT)
Dept: CT IMAGING | Age: 40
End: 2025-01-19

## 2025-01-19 VITALS
OXYGEN SATURATION: 97 % | TEMPERATURE: 98.2 F | RESPIRATION RATE: 16 BRPM | SYSTOLIC BLOOD PRESSURE: 130 MMHG | DIASTOLIC BLOOD PRESSURE: 87 MMHG | HEART RATE: 93 BPM

## 2025-01-19 DIAGNOSIS — R10.9 ABDOMINAL PAIN, UNSPECIFIED ABDOMINAL LOCATION: Primary | ICD-10-CM

## 2025-01-19 LAB
ALBUMIN SERPL-MCNC: 4.4 G/DL (ref 3.4–5)
ALBUMIN/GLOB SERPL: 1.5 {RATIO} (ref 1.1–2.2)
ALP SERPL-CCNC: 55 U/L (ref 40–129)
ALT SERPL-CCNC: 17 U/L (ref 10–40)
ANION GAP SERPL CALCULATED.3IONS-SCNC: 12 MMOL/L (ref 3–16)
AST SERPL-CCNC: 18 U/L (ref 15–37)
BACTERIA URNS QL MICRO: NORMAL /HPF
BASOPHILS # BLD: 0.1 K/UL (ref 0–0.2)
BASOPHILS NFR BLD: 1.2 %
BILIRUB SERPL-MCNC: 0.3 MG/DL (ref 0–1)
BILIRUB UR QL STRIP.AUTO: NEGATIVE
BUN SERPL-MCNC: 9 MG/DL (ref 7–20)
CALCIUM SERPL-MCNC: 9.2 MG/DL (ref 8.3–10.6)
CHLORIDE SERPL-SCNC: 103 MMOL/L (ref 99–110)
CLARITY UR: CLEAR
CO2 SERPL-SCNC: 23 MMOL/L (ref 21–32)
COLOR UR: YELLOW
CREAT SERPL-MCNC: 0.6 MG/DL (ref 0.6–1.1)
DEPRECATED RDW RBC AUTO: 14.5 % (ref 12.4–15.4)
EOSINOPHIL # BLD: 0.1 K/UL (ref 0–0.6)
EOSINOPHIL NFR BLD: 1.6 %
EPI CELLS #/AREA URNS AUTO: 5 /HPF (ref 0–5)
GFR SERPLBLD CREATININE-BSD FMLA CKD-EPI: >90 ML/MIN/{1.73_M2}
GLUCOSE SERPL-MCNC: 90 MG/DL (ref 70–99)
GLUCOSE UR STRIP.AUTO-MCNC: NEGATIVE MG/DL
HCG SERPL QL: NEGATIVE
HCT VFR BLD AUTO: 40.4 % (ref 36–48)
HGB BLD-MCNC: 13.8 G/DL (ref 12–16)
HGB UR QL STRIP.AUTO: NEGATIVE
HYALINE CASTS #/AREA URNS AUTO: 0 /LPF (ref 0–8)
KETONES UR STRIP.AUTO-MCNC: NEGATIVE MG/DL
LEUKOCYTE ESTERASE UR QL STRIP.AUTO: ABNORMAL
LIPASE SERPL-CCNC: 22 U/L (ref 13–60)
LYMPHOCYTES # BLD: 3.3 K/UL (ref 1–5.1)
LYMPHOCYTES NFR BLD: 37.9 %
MCH RBC QN AUTO: 28.1 PG (ref 26–34)
MCHC RBC AUTO-ENTMCNC: 34.1 G/DL (ref 31–36)
MCV RBC AUTO: 82.4 FL (ref 80–100)
MONOCYTES # BLD: 0.6 K/UL (ref 0–1.3)
MONOCYTES NFR BLD: 7.2 %
NEUTROPHILS # BLD: 4.5 K/UL (ref 1.7–7.7)
NEUTROPHILS NFR BLD: 52.1 %
NITRITE UR QL STRIP.AUTO: NEGATIVE
PH UR STRIP.AUTO: 6.5 [PH] (ref 5–8)
PLATELET # BLD AUTO: 292 K/UL (ref 135–450)
PMV BLD AUTO: 8 FL (ref 5–10.5)
POTASSIUM SERPL-SCNC: 3.9 MMOL/L (ref 3.5–5.1)
PROT SERPL-MCNC: 7.4 G/DL (ref 6.4–8.2)
PROT UR STRIP.AUTO-MCNC: NEGATIVE MG/DL
RBC # BLD AUTO: 4.9 M/UL (ref 4–5.2)
RBC CLUMPS #/AREA URNS AUTO: 1 /HPF (ref 0–4)
SODIUM SERPL-SCNC: 138 MMOL/L (ref 136–145)
SP GR UR STRIP.AUTO: <=1.005 (ref 1–1.03)
UA COMPLETE W REFLEX CULTURE PNL UR: ABNORMAL
UA DIPSTICK W REFLEX MICRO PNL UR: YES
URN SPEC COLLECT METH UR: ABNORMAL
UROBILINOGEN UR STRIP-ACNC: 0.2 E.U./DL
WBC # BLD AUTO: 8.7 K/UL (ref 4–11)
WBC #/AREA URNS AUTO: 5 /HPF (ref 0–5)

## 2025-01-19 PROCEDURE — 84703 CHORIONIC GONADOTROPIN ASSAY: CPT

## 2025-01-19 PROCEDURE — 6360000004 HC RX CONTRAST MEDICATION: Performed by: PHYSICIAN ASSISTANT

## 2025-01-19 PROCEDURE — 6360000002 HC RX W HCPCS: Performed by: PHYSICIAN ASSISTANT

## 2025-01-19 PROCEDURE — 83690 ASSAY OF LIPASE: CPT

## 2025-01-19 PROCEDURE — 85025 COMPLETE CBC W/AUTO DIFF WBC: CPT

## 2025-01-19 PROCEDURE — 96374 THER/PROPH/DIAG INJ IV PUSH: CPT

## 2025-01-19 PROCEDURE — 81001 URINALYSIS AUTO W/SCOPE: CPT

## 2025-01-19 PROCEDURE — 74177 CT ABD & PELVIS W/CONTRAST: CPT

## 2025-01-19 PROCEDURE — 80053 COMPREHEN METABOLIC PANEL: CPT

## 2025-01-19 PROCEDURE — 99285 EMERGENCY DEPT VISIT HI MDM: CPT

## 2025-01-19 PROCEDURE — 96375 TX/PRO/DX INJ NEW DRUG ADDON: CPT

## 2025-01-19 RX ORDER — IOPAMIDOL 755 MG/ML
75 INJECTION, SOLUTION INTRAVASCULAR
Status: COMPLETED | OUTPATIENT
Start: 2025-01-19 | End: 2025-01-19

## 2025-01-19 RX ORDER — KETOROLAC TROMETHAMINE 30 MG/ML
30 INJECTION, SOLUTION INTRAMUSCULAR; INTRAVENOUS ONCE
Status: COMPLETED | OUTPATIENT
Start: 2025-01-19 | End: 2025-01-19

## 2025-01-19 RX ORDER — ONDANSETRON 2 MG/ML
4 INJECTION INTRAMUSCULAR; INTRAVENOUS ONCE
Status: COMPLETED | OUTPATIENT
Start: 2025-01-19 | End: 2025-01-19

## 2025-01-19 RX ADMIN — IOPAMIDOL 75 ML: 755 INJECTION, SOLUTION INTRAVENOUS at 23:14

## 2025-01-19 RX ADMIN — ONDANSETRON 4 MG: 2 INJECTION, SOLUTION INTRAMUSCULAR; INTRAVENOUS at 22:24

## 2025-01-19 RX ADMIN — KETOROLAC TROMETHAMINE 30 MG: 30 INJECTION, SOLUTION INTRAMUSCULAR at 22:24

## 2025-01-20 RX ORDER — DICYCLOMINE HCL 20 MG
20 TABLET ORAL 4 TIMES DAILY
Qty: 20 TABLET | Refills: 0 | Status: SHIPPED | OUTPATIENT
Start: 2025-01-20

## 2025-01-20 ASSESSMENT — ENCOUNTER SYMPTOMS
NAUSEA: 0
SHORTNESS OF BREATH: 0
CHEST TIGHTNESS: 0
DIARRHEA: 0
VOMITING: 0
ABDOMINAL PAIN: 1

## 2025-01-20 NOTE — ED PROVIDER NOTES
process.   4. Trace physiologic appearing free fluid in the pelvis.             PROCEDURES   Unless otherwise noted below, none     Procedures    CRITICAL CARE TIME (.cctime)       PAST MEDICAL HISTORY      has a past medical history of Anxiety, Depression, Depression, and Schizophrenia (HCC).     Chronic Conditions affecting Care: None    EMERGENCY DEPARTMENT COURSE and DIFFERENTIAL DIAGNOSIS/MDM:   Vitals:    Vitals:    01/19/25 2149   BP: 130/87   Pulse: 93   Resp: 16   Temp: 98.2 °F (36.8 °C)   SpO2: 97%       Patient was given the following medications:  Medications   ketorolac (TORADOL) injection 30 mg (30 mg IntraVENous Given 1/19/25 2224)   ondansetron (ZOFRAN) injection 4 mg (4 mg IntraVENous Given 1/19/25 2224)   iopamidol (ISOVUE-370) 76 % injection 75 mL (75 mLs IntraVENous Given 1/19/25 2314)             Is this patient to be included in the SEP-1 Core Measure due to severe sepsis or septic shock?   No   Exclusion criteria - the patient is NOT to be included for SEP-1 Core Measure due to:  Infection is not suspected    CONSULTS: (Who and What was discussed)  None  Discussion with Other Profesionals : None    Social Determinants : None    Records Reviewed : None    CC/HPI Summary, DDx, ED Course, and Reassessment: Patient with a history of depression, anxiety, schizophrenia and psychosis presented to the emergency department today complaining of abdominal pain, bloating and loose stools that began this morning.  Patient describes an achy, intermittent, 6/10 pain that localizes to her right abdomen and radiates to her left abdomen.  She denies nausea or vomiting.  She has no urinary or vaginal complaints stating her LMP was 1/2/2024.  She denies fever or chills and is nonfebrile here.    Patient has mild generalized abdominal tenderness on palpation.  There is no distention, rigidity or guarding.  There is no Baez sign, McBurney's point or CVA tenderness on palpation.  Abdomen is soft with bowel sounds

## 2025-03-07 NOTE — BH NOTE
Mahesh has been calm, cooperative and more visible on unit. She has been medication compliant and currently denies SI/HI and AVH.   Reach out to pt to inform her that she can  her tens unit to at the [place]. Pt understood